# Patient Record
Sex: FEMALE | Race: WHITE | Employment: OTHER | URBAN - METROPOLITAN AREA
[De-identification: names, ages, dates, MRNs, and addresses within clinical notes are randomized per-mention and may not be internally consistent; named-entity substitution may affect disease eponyms.]

---

## 2017-01-01 ENCOUNTER — ONCOLOGY VISIT (OUTPATIENT)
Dept: ONCOLOGY | Facility: CLINIC | Age: 77
End: 2017-01-01
Attending: INTERNAL MEDICINE
Payer: COMMERCIAL

## 2017-01-01 ENCOUNTER — TELEPHONE (OUTPATIENT)
Dept: FAMILY MEDICINE | Facility: CLINIC | Age: 77
End: 2017-01-01

## 2017-01-01 ENCOUNTER — OFFICE VISIT (OUTPATIENT)
Dept: RADIATION THERAPY | Facility: OUTPATIENT CENTER | Age: 77
End: 2017-01-01

## 2017-01-01 ENCOUNTER — APPOINTMENT (OUTPATIENT)
Dept: CT IMAGING | Facility: CLINIC | Age: 77
End: 2017-01-01
Attending: EMERGENCY MEDICINE
Payer: MEDICARE

## 2017-01-01 ENCOUNTER — OFFICE VISIT (OUTPATIENT)
Dept: FAMILY MEDICINE | Facility: CLINIC | Age: 77
End: 2017-01-01
Payer: COMMERCIAL

## 2017-01-01 ENCOUNTER — HOSPITAL ENCOUNTER (OUTPATIENT)
Dept: CT IMAGING | Facility: CLINIC | Age: 77
End: 2017-01-23
Attending: INTERNAL MEDICINE
Payer: MEDICARE

## 2017-01-01 ENCOUNTER — HOSPITAL ENCOUNTER (OUTPATIENT)
Facility: CLINIC | Age: 77
Setting detail: OBSERVATION
Discharge: HOME | End: 2017-06-28
Attending: EMERGENCY MEDICINE | Admitting: FAMILY MEDICINE
Payer: MEDICARE

## 2017-01-01 ENCOUNTER — TELEPHONE (OUTPATIENT)
Dept: RADIATION THERAPY | Facility: OUTPATIENT CENTER | Age: 77
End: 2017-01-01

## 2017-01-01 ENCOUNTER — APPOINTMENT (OUTPATIENT)
Dept: GENERAL RADIOLOGY | Facility: CLINIC | Age: 77
End: 2017-01-01
Attending: FAMILY MEDICINE
Payer: MEDICARE

## 2017-01-01 ENCOUNTER — MYC MEDICAL ADVICE (OUTPATIENT)
Dept: FAMILY MEDICINE | Facility: CLINIC | Age: 77
End: 2017-01-01

## 2017-01-01 ENCOUNTER — TELEPHONE (OUTPATIENT)
Dept: ONCOLOGY | Facility: CLINIC | Age: 77
End: 2017-01-01

## 2017-01-01 ENCOUNTER — CARE COORDINATION (OUTPATIENT)
Dept: CARE COORDINATION | Facility: CLINIC | Age: 77
End: 2017-01-01

## 2017-01-01 ENCOUNTER — HOSPITAL ENCOUNTER (OUTPATIENT)
Dept: LAB | Facility: CLINIC | Age: 77
Discharge: HOME | End: 2017-01-23
Attending: INTERNAL MEDICINE | Admitting: INTERNAL MEDICINE
Payer: MEDICARE

## 2017-01-01 ENCOUNTER — HOSPITAL ENCOUNTER (EMERGENCY)
Facility: CLINIC | Age: 77
Discharge: HOME | End: 2017-05-28
Attending: FAMILY MEDICINE | Admitting: FAMILY MEDICINE
Payer: MEDICARE

## 2017-01-01 VITALS
DIASTOLIC BLOOD PRESSURE: 77 MMHG | HEART RATE: 90 BPM | RESPIRATION RATE: 20 BRPM | OXYGEN SATURATION: 94 % | SYSTOLIC BLOOD PRESSURE: 149 MMHG

## 2017-01-01 VITALS
SYSTOLIC BLOOD PRESSURE: 180 MMHG | OXYGEN SATURATION: 98 % | HEART RATE: 96 BPM | WEIGHT: 120.4 LBS | TEMPERATURE: 97 F | DIASTOLIC BLOOD PRESSURE: 68 MMHG | BODY MASS INDEX: 21.33 KG/M2

## 2017-01-01 VITALS
SYSTOLIC BLOOD PRESSURE: 137 MMHG | HEART RATE: 100 BPM | TEMPERATURE: 97.7 F | BODY MASS INDEX: 20.91 KG/M2 | WEIGHT: 118 LBS | OXYGEN SATURATION: 98 % | HEIGHT: 63 IN | DIASTOLIC BLOOD PRESSURE: 74 MMHG | RESPIRATION RATE: 16 BRPM

## 2017-01-01 VITALS
RESPIRATION RATE: 22 BRPM | TEMPERATURE: 98 F | DIASTOLIC BLOOD PRESSURE: 81 MMHG | OXYGEN SATURATION: 95 % | SYSTOLIC BLOOD PRESSURE: 152 MMHG | HEART RATE: 88 BPM

## 2017-01-01 VITALS — SYSTOLIC BLOOD PRESSURE: 155 MMHG | HEART RATE: 78 BPM | DIASTOLIC BLOOD PRESSURE: 75 MMHG

## 2017-01-01 VITALS
TEMPERATURE: 98.1 F | WEIGHT: 120 LBS | BODY MASS INDEX: 21.26 KG/M2 | HEIGHT: 63 IN | DIASTOLIC BLOOD PRESSURE: 81 MMHG | RESPIRATION RATE: 16 BRPM | HEART RATE: 82 BPM | SYSTOLIC BLOOD PRESSURE: 130 MMHG | OXYGEN SATURATION: 97 %

## 2017-01-01 VITALS
DIASTOLIC BLOOD PRESSURE: 81 MMHG | HEART RATE: 81 BPM | WEIGHT: 118.17 LBS | BODY MASS INDEX: 20.94 KG/M2 | OXYGEN SATURATION: 98 % | HEIGHT: 63 IN | SYSTOLIC BLOOD PRESSURE: 153 MMHG | RESPIRATION RATE: 18 BRPM | TEMPERATURE: 98.7 F

## 2017-01-01 VITALS
HEART RATE: 96 BPM | OXYGEN SATURATION: 98 % | DIASTOLIC BLOOD PRESSURE: 77 MMHG | RESPIRATION RATE: 18 BRPM | SYSTOLIC BLOOD PRESSURE: 156 MMHG

## 2017-01-01 DIAGNOSIS — B02.9 HERPES ZOSTER WITHOUT COMPLICATION: Primary | ICD-10-CM

## 2017-01-01 DIAGNOSIS — J44.9 CHRONIC OBSTRUCTIVE PULMONARY DISEASE, UNSPECIFIED COPD TYPE (H): Chronic | ICD-10-CM

## 2017-01-01 DIAGNOSIS — C34.90 MALIGNANT NEOPLASM OF LUNG, UNSPECIFIED LATERALITY, UNSPECIFIED PART OF LUNG (H): Chronic | ICD-10-CM

## 2017-01-01 DIAGNOSIS — C79.51 BONE METASTASIS: Primary | ICD-10-CM

## 2017-01-01 DIAGNOSIS — K92.2 GASTROINTESTINAL HEMORRHAGE, UNSPECIFIED GASTROINTESTINAL HEMORRHAGE TYPE: ICD-10-CM

## 2017-01-01 DIAGNOSIS — M25.552 PAIN IN JOINT INVOLVING LEFT PELVIC REGION AND THIGH: ICD-10-CM

## 2017-01-01 DIAGNOSIS — C34.12 MALIGNANT NEOPLASM OF UPPER LOBE OF LEFT LUNG (H): ICD-10-CM

## 2017-01-01 DIAGNOSIS — J44.9 CHRONIC OBSTRUCTIVE PULMONARY DISEASE, UNSPECIFIED COPD TYPE (H): ICD-10-CM

## 2017-01-01 DIAGNOSIS — K52.9 COLITIS: Primary | ICD-10-CM

## 2017-01-01 DIAGNOSIS — K52.9 COLITIS: ICD-10-CM

## 2017-01-01 DIAGNOSIS — J44.9 CHRONIC OBSTRUCTIVE PULMONARY DISEASE, UNSPECIFIED COPD TYPE (H): Primary | Chronic | ICD-10-CM

## 2017-01-01 DIAGNOSIS — C34.90 MALIGNANT NEOPLASM OF LUNG, UNSPECIFIED LATERALITY, UNSPECIFIED PART OF LUNG (H): Primary | ICD-10-CM

## 2017-01-01 DIAGNOSIS — M25.552 PAIN IN JOINT INVOLVING LEFT PELVIC REGION AND THIGH: Primary | ICD-10-CM

## 2017-01-01 DIAGNOSIS — R23.3 SPONTANEOUS ECCHYMOSIS: ICD-10-CM

## 2017-01-01 DIAGNOSIS — C79.51 BONE METASTASIS: Chronic | ICD-10-CM

## 2017-01-01 DIAGNOSIS — Z79.899 MEDICAL MARIJUANA USE: ICD-10-CM

## 2017-01-01 DIAGNOSIS — R93.89 ABNORMAL MRI: ICD-10-CM

## 2017-01-01 DIAGNOSIS — C34.12 MALIGNANT NEOPLASM OF UPPER LOBE OF LEFT LUNG (H): Primary | ICD-10-CM

## 2017-01-01 DIAGNOSIS — C79.51 METASTASIS TO BONE (H): Primary | ICD-10-CM

## 2017-01-01 LAB
ABO + RH BLD: NORMAL
ABO + RH BLD: NORMAL
ALBUMIN SERPL-MCNC: 3.5 G/DL (ref 3.4–5)
ALP SERPL-CCNC: 97 U/L (ref 40–150)
ALT SERPL W P-5'-P-CCNC: 17 U/L (ref 0–50)
ANION GAP SERPL CALCULATED.3IONS-SCNC: 4 MMOL/L (ref 3–14)
ANION GAP SERPL CALCULATED.3IONS-SCNC: 7 MMOL/L (ref 3–14)
ANION GAP SERPL CALCULATED.3IONS-SCNC: 9 MMOL/L (ref 3–14)
AST SERPL W P-5'-P-CCNC: 14 U/L (ref 0–45)
BASOPHILS # BLD AUTO: 0 10E9/L (ref 0–0.2)
BASOPHILS # BLD AUTO: 0.1 10E9/L (ref 0–0.2)
BASOPHILS NFR BLD AUTO: 0.4 %
BASOPHILS NFR BLD AUTO: 0.5 %
BILIRUB SERPL-MCNC: 0.6 MG/DL (ref 0.2–1.3)
BLD GP AB SCN SERPL QL: NORMAL
BLOOD BANK CMNT PATIENT-IMP: NORMAL
BUN SERPL-MCNC: 14 MG/DL (ref 7–30)
BUN SERPL-MCNC: 14 MG/DL (ref 7–30)
BUN SERPL-MCNC: 8 MG/DL (ref 7–30)
CALCIUM SERPL-MCNC: 8.4 MG/DL (ref 8.5–10.1)
CALCIUM SERPL-MCNC: 8.7 MG/DL (ref 8.5–10.1)
CALCIUM SERPL-MCNC: 8.7 MG/DL (ref 8.5–10.1)
CAMPYLOBACTER GROUP BY NAT: NOT DETECTED
CHLORIDE SERPL-SCNC: 103 MMOL/L (ref 94–109)
CHLORIDE SERPL-SCNC: 107 MMOL/L (ref 94–109)
CHLORIDE SERPL-SCNC: 110 MMOL/L (ref 94–109)
CO2 SERPL-SCNC: 25 MMOL/L (ref 20–32)
CO2 SERPL-SCNC: 25 MMOL/L (ref 20–32)
CO2 SERPL-SCNC: 28 MMOL/L (ref 20–32)
CREAT SERPL-MCNC: 0.63 MG/DL (ref 0.52–1.04)
CREAT SERPL-MCNC: 0.64 MG/DL (ref 0.52–1.04)
CREAT SERPL-MCNC: 0.77 MG/DL (ref 0.52–1.04)
DIFFERENTIAL METHOD BLD: ABNORMAL
DIFFERENTIAL METHOD BLD: ABNORMAL
ENTERIC PATHOGEN COMMENT: NORMAL
EOSINOPHIL # BLD AUTO: 0.1 10E9/L (ref 0–0.7)
EOSINOPHIL # BLD AUTO: 0.1 10E9/L (ref 0–0.7)
EOSINOPHIL NFR BLD AUTO: 0.4 %
EOSINOPHIL NFR BLD AUTO: 1 %
ERYTHROCYTE [DISTWIDTH] IN BLOOD BY AUTOMATED COUNT: 11.6 % (ref 10–15)
ERYTHROCYTE [DISTWIDTH] IN BLOOD BY AUTOMATED COUNT: 12.2 % (ref 10–15)
GFR SERPL CREATININE-BSD FRML MDRD: 73 ML/MIN/1.7M2
GFR SERPL CREATININE-BSD FRML MDRD: 90 ML/MIN/1.7M2
GFR SERPL CREATININE-BSD FRML MDRD: ABNORMAL ML/MIN/1.7M2
GLUCOSE SERPL-MCNC: 101 MG/DL (ref 70–99)
GLUCOSE SERPL-MCNC: 133 MG/DL (ref 70–99)
GLUCOSE SERPL-MCNC: 91 MG/DL (ref 70–99)
HBA1C MFR BLD: 5.1 % (ref 4.3–6)
HCT VFR BLD AUTO: 33.6 % (ref 35–47)
HCT VFR BLD AUTO: 37.4 % (ref 35–47)
HGB BLD-MCNC: 10.6 G/DL (ref 11.7–15.7)
HGB BLD-MCNC: 10.6 G/DL (ref 11.7–15.7)
HGB BLD-MCNC: 11.3 G/DL (ref 11.7–15.7)
HGB BLD-MCNC: 11.6 G/DL (ref 11.7–15.7)
HGB BLD-MCNC: 11.6 G/DL (ref 11.7–15.7)
HGB BLD-MCNC: 12.2 G/DL (ref 11.7–15.7)
HGB BLD-MCNC: 12.4 G/DL (ref 11.7–15.7)
IMM GRANULOCYTES # BLD: 0 10E9/L (ref 0–0.4)
IMM GRANULOCYTES # BLD: 0 10E9/L (ref 0–0.4)
IMM GRANULOCYTES NFR BLD: 0.1 %
IMM GRANULOCYTES NFR BLD: 0.2 %
LACTATE BLD-SCNC: 0.9 MMOL/L (ref 0.7–2.1)
LYMPHOCYTES # BLD AUTO: 0.6 10E9/L (ref 0.8–5.3)
LYMPHOCYTES # BLD AUTO: 0.8 10E9/L (ref 0.8–5.3)
LYMPHOCYTES NFR BLD AUTO: 4.6 %
LYMPHOCYTES NFR BLD AUTO: 8.7 %
MCH RBC QN AUTO: 31.3 PG (ref 26.5–33)
MCH RBC QN AUTO: 32.1 PG (ref 26.5–33)
MCHC RBC AUTO-ENTMCNC: 31.5 G/DL (ref 31.5–36.5)
MCHC RBC AUTO-ENTMCNC: 33.2 G/DL (ref 31.5–36.5)
MCV RBC AUTO: 97 FL (ref 78–100)
MCV RBC AUTO: 99 FL (ref 78–100)
MONOCYTES # BLD AUTO: 0.8 10E9/L (ref 0–1.3)
MONOCYTES # BLD AUTO: 1 10E9/L (ref 0–1.3)
MONOCYTES NFR BLD AUTO: 10.7 %
MONOCYTES NFR BLD AUTO: 6.6 %
NEUTROPHILS # BLD AUTO: 11.1 10E9/L (ref 1.6–8.3)
NEUTROPHILS # BLD AUTO: 7.6 10E9/L (ref 1.6–8.3)
NEUTROPHILS NFR BLD AUTO: 79.1 %
NEUTROPHILS NFR BLD AUTO: 87.7 %
NOROVIRUS I AND II BY NAT: NOT DETECTED
PLATELET # BLD AUTO: 264 10E9/L (ref 150–450)
PLATELET # BLD AUTO: 335 10E9/L (ref 150–450)
POTASSIUM SERPL-SCNC: 3.7 MMOL/L (ref 3.4–5.3)
POTASSIUM SERPL-SCNC: 4.5 MMOL/L (ref 3.4–5.3)
POTASSIUM SERPL-SCNC: 4.5 MMOL/L (ref 3.4–5.3)
PROT SERPL-MCNC: 7.1 G/DL (ref 6.8–8.8)
RBC # BLD AUTO: 3.39 10E12/L (ref 3.8–5.2)
RBC # BLD AUTO: 3.86 10E12/L (ref 3.8–5.2)
ROTAVIRUS A BY NAT: NOT DETECTED
SALMONELLA SPECIES BY NAT: NOT DETECTED
SHIGA TOXIN 1 GENE BY NAT: NOT DETECTED
SHIGA TOXIN 2 GENE BY NAT: NOT DETECTED
SHIGELLA SP+EIEC IPAH STL QL NAA+PROBE: NOT DETECTED
SODIUM SERPL-SCNC: 135 MMOL/L (ref 133–144)
SODIUM SERPL-SCNC: 141 MMOL/L (ref 133–144)
SODIUM SERPL-SCNC: 142 MMOL/L (ref 133–144)
SPECIMEN EXP DATE BLD: NORMAL
VIBRIO GROUP BY NAT: NOT DETECTED
WBC # BLD AUTO: 12.7 10E9/L (ref 4–11)
WBC # BLD AUTO: 9.6 10E9/L (ref 4–11)
YERSINIA ENTEROCOLITICA BY NAT: NOT DETECTED

## 2017-01-01 PROCEDURE — 99283 EMERGENCY DEPT VISIT LOW MDM: CPT

## 2017-01-01 PROCEDURE — 94640 AIRWAY INHALATION TREATMENT: CPT

## 2017-01-01 PROCEDURE — 80053 COMPREHEN METABOLIC PANEL: CPT | Performed by: EMERGENCY MEDICINE

## 2017-01-01 PROCEDURE — 86901 BLOOD TYPING SEROLOGIC RH(D): CPT | Performed by: EMERGENCY MEDICINE

## 2017-01-01 PROCEDURE — 96376 TX/PRO/DX INJ SAME DRUG ADON: CPT

## 2017-01-01 PROCEDURE — 72192 CT PELVIS W/O DYE: CPT

## 2017-01-01 PROCEDURE — 99207 ZZC CDG-CHARGE REQUIRED MANUAL ENTRY: CPT | Performed by: FAMILY MEDICINE

## 2017-01-01 PROCEDURE — 99217 ZZC OBSERVATION CARE DISCHARGE: CPT | Performed by: FAMILY MEDICINE

## 2017-01-01 PROCEDURE — 85018 HEMOGLOBIN: CPT | Mod: 91 | Performed by: PHYSICIAN ASSISTANT

## 2017-01-01 PROCEDURE — A9270 NON-COVERED ITEM OR SERVICE: HCPCS | Mod: GY | Performed by: FAMILY MEDICINE

## 2017-01-01 PROCEDURE — 99285 EMERGENCY DEPT VISIT HI MDM: CPT | Mod: 25

## 2017-01-01 PROCEDURE — 83605 ASSAY OF LACTIC ACID: CPT | Performed by: FAMILY MEDICINE

## 2017-01-01 PROCEDURE — 25000132 ZZH RX MED GY IP 250 OP 250 PS 637: Mod: GY | Performed by: EMERGENCY MEDICINE

## 2017-01-01 PROCEDURE — 99204 OFFICE O/P NEW MOD 45 MIN: CPT | Performed by: INTERNAL MEDICINE

## 2017-01-01 PROCEDURE — 74177 CT ABD & PELVIS W/CONTRAST: CPT

## 2017-01-01 PROCEDURE — 99220 ZZC INITIAL OBSERVATION CARE,LEVL III: CPT | Performed by: FAMILY MEDICINE

## 2017-01-01 PROCEDURE — 25000132 ZZH RX MED GY IP 250 OP 250 PS 637: Mod: GY | Performed by: PHYSICIAN ASSISTANT

## 2017-01-01 PROCEDURE — 36415 COLL VENOUS BLD VENIPUNCTURE: CPT | Performed by: FAMILY MEDICINE

## 2017-01-01 PROCEDURE — 96374 THER/PROPH/DIAG INJ IV PUSH: CPT

## 2017-01-01 PROCEDURE — 96361 HYDRATE IV INFUSION ADD-ON: CPT

## 2017-01-01 PROCEDURE — 94640 AIRWAY INHALATION TREATMENT: CPT | Mod: 76

## 2017-01-01 PROCEDURE — 85025 COMPLETE CBC W/AUTO DIFF WBC: CPT | Performed by: EMERGENCY MEDICINE

## 2017-01-01 PROCEDURE — 83036 HEMOGLOBIN GLYCOSYLATED A1C: CPT | Performed by: PHYSICIAN ASSISTANT

## 2017-01-01 PROCEDURE — 36415 COLL VENOUS BLD VENIPUNCTURE: CPT | Performed by: PHYSICIAN ASSISTANT

## 2017-01-01 PROCEDURE — A9270 NON-COVERED ITEM OR SERVICE: HCPCS | Mod: GY | Performed by: PHYSICIAN ASSISTANT

## 2017-01-01 PROCEDURE — G0378 HOSPITAL OBSERVATION PER HR: HCPCS

## 2017-01-01 PROCEDURE — 99283 EMERGENCY DEPT VISIT LOW MDM: CPT | Performed by: FAMILY MEDICINE

## 2017-01-01 PROCEDURE — 25000128 H RX IP 250 OP 636: Performed by: EMERGENCY MEDICINE

## 2017-01-01 PROCEDURE — 40000275 ZZH STATISTIC RCP TIME EA 10 MIN

## 2017-01-01 PROCEDURE — 25000125 ZZHC RX 250: Performed by: PHYSICIAN ASSISTANT

## 2017-01-01 PROCEDURE — 87506 IADNA-DNA/RNA PROBE TQ 6-11: CPT | Performed by: PHYSICIAN ASSISTANT

## 2017-01-01 PROCEDURE — A9270 NON-COVERED ITEM OR SERVICE: HCPCS | Mod: GY | Performed by: EMERGENCY MEDICINE

## 2017-01-01 PROCEDURE — 85025 COMPLETE CBC W/AUTO DIFF WBC: CPT | Performed by: PHYSICIAN ASSISTANT

## 2017-01-01 PROCEDURE — 85018 HEMOGLOBIN: CPT | Performed by: PHYSICIAN ASSISTANT

## 2017-01-01 PROCEDURE — 86850 RBC ANTIBODY SCREEN: CPT | Performed by: EMERGENCY MEDICINE

## 2017-01-01 PROCEDURE — 99495 TRANSJ CARE MGMT MOD F2F 14D: CPT | Performed by: PHYSICIAN ASSISTANT

## 2017-01-01 PROCEDURE — S0164 INJECTION PANTROPRAZOLE: HCPCS | Performed by: PHYSICIAN ASSISTANT

## 2017-01-01 PROCEDURE — 80048 BASIC METABOLIC PNL TOTAL CA: CPT | Performed by: INTERNAL MEDICINE

## 2017-01-01 PROCEDURE — 25000132 ZZH RX MED GY IP 250 OP 250 PS 637: Mod: GY | Performed by: FAMILY MEDICINE

## 2017-01-01 PROCEDURE — 86900 BLOOD TYPING SEROLOGIC ABO: CPT | Performed by: EMERGENCY MEDICINE

## 2017-01-01 PROCEDURE — 96360 HYDRATION IV INFUSION INIT: CPT

## 2017-01-01 PROCEDURE — 73590 X-RAY EXAM OF LOWER LEG: CPT | Mod: RT

## 2017-01-01 PROCEDURE — 40000270 ZZH STATISTIC OXYGEN  O2DAILY TECH TIME

## 2017-01-01 PROCEDURE — 96375 TX/PRO/DX INJ NEW DRUG ADDON: CPT

## 2017-01-01 PROCEDURE — 36415 COLL VENOUS BLD VENIPUNCTURE: CPT | Performed by: INTERNAL MEDICINE

## 2017-01-01 PROCEDURE — 80048 BASIC METABOLIC PNL TOTAL CA: CPT | Performed by: PHYSICIAN ASSISTANT

## 2017-01-01 PROCEDURE — 25000128 H RX IP 250 OP 636: Performed by: PHYSICIAN ASSISTANT

## 2017-01-01 PROCEDURE — 25000125 ZZHC RX 250: Performed by: EMERGENCY MEDICINE

## 2017-01-01 PROCEDURE — 99213 OFFICE O/P EST LOW 20 MIN: CPT | Performed by: NURSE PRACTITIONER

## 2017-01-01 PROCEDURE — 99211 OFF/OP EST MAY X REQ PHY/QHP: CPT

## 2017-01-01 PROCEDURE — 99285 EMERGENCY DEPT VISIT HI MDM: CPT | Performed by: EMERGENCY MEDICINE

## 2017-01-01 RX ORDER — ACETAMINOPHEN 325 MG/1
650 TABLET ORAL ONCE
Status: COMPLETED | OUTPATIENT
Start: 2017-01-01 | End: 2017-01-01

## 2017-01-01 RX ORDER — SODIUM CHLORIDE, SODIUM LACTATE, POTASSIUM CHLORIDE, CALCIUM CHLORIDE 600; 310; 30; 20 MG/100ML; MG/100ML; MG/100ML; MG/100ML
INJECTION, SOLUTION INTRAVENOUS CONTINUOUS
Status: DISCONTINUED | OUTPATIENT
Start: 2017-01-01 | End: 2017-01-01

## 2017-01-01 RX ORDER — ACETAMINOPHEN 325 MG/1
650 TABLET ORAL EVERY 4 HOURS PRN
Status: CANCELLED | OUTPATIENT
Start: 2017-01-01

## 2017-01-01 RX ORDER — OXYCODONE HYDROCHLORIDE 5 MG/1
5 TABLET ORAL EVERY 6 HOURS PRN
Qty: 30 TABLET | Refills: 0 | Status: SHIPPED | OUTPATIENT
Start: 2017-01-01 | End: 2017-01-01

## 2017-01-01 RX ORDER — BUDESONIDE 0.25 MG/2ML
0.25 INHALANT ORAL 2 TIMES DAILY
Status: DISCONTINUED | OUTPATIENT
Start: 2017-01-01 | End: 2017-01-01 | Stop reason: HOSPADM

## 2017-01-01 RX ORDER — POLYETHYLENE GLYCOL 3350 17 G/17G
1 POWDER, FOR SOLUTION ORAL DAILY PRN
COMMUNITY

## 2017-01-01 RX ORDER — ONDANSETRON 4 MG/1
4 TABLET, ORALLY DISINTEGRATING ORAL EVERY 6 HOURS PRN
Status: DISCONTINUED | OUTPATIENT
Start: 2017-01-01 | End: 2017-01-01 | Stop reason: HOSPADM

## 2017-01-01 RX ORDER — BUDESONIDE 0.5 MG/2ML
0.5 INHALANT ORAL DAILY
Qty: 60 ML | Refills: 11 | Status: SHIPPED | OUTPATIENT
Start: 2017-01-01

## 2017-01-01 RX ORDER — IBUPROFEN 400 MG/1
400 TABLET, FILM COATED ORAL ONCE
Status: DISCONTINUED | OUTPATIENT
Start: 2017-01-01 | End: 2017-01-01

## 2017-01-01 RX ORDER — NALOXONE HYDROCHLORIDE 0.4 MG/ML
.1-.4 INJECTION, SOLUTION INTRAMUSCULAR; INTRAVENOUS; SUBCUTANEOUS
Status: DISCONTINUED | OUTPATIENT
Start: 2017-01-01 | End: 2017-01-01 | Stop reason: HOSPADM

## 2017-01-01 RX ORDER — PROCHLORPERAZINE MALEATE 5 MG
5 TABLET ORAL EVERY 6 HOURS PRN
Status: DISCONTINUED | OUTPATIENT
Start: 2017-01-01 | End: 2017-01-01 | Stop reason: HOSPADM

## 2017-01-01 RX ORDER — IPRATROPIUM BROMIDE AND ALBUTEROL 20; 100 UG/1; UG/1
SPRAY, METERED RESPIRATORY (INHALATION)
Qty: 4 G | Refills: 11 | Status: SHIPPED | OUTPATIENT
Start: 2017-01-01

## 2017-01-01 RX ORDER — ACETAMINOPHEN 500 MG
1000 TABLET ORAL EVERY 6 HOURS PRN
Status: DISCONTINUED | OUTPATIENT
Start: 2017-01-01 | End: 2017-01-01 | Stop reason: HOSPADM

## 2017-01-01 RX ORDER — IOPAMIDOL 755 MG/ML
53 INJECTION, SOLUTION INTRAVASCULAR ONCE
Status: COMPLETED | OUTPATIENT
Start: 2017-01-01 | End: 2017-01-01

## 2017-01-01 RX ORDER — MULTIVITAMIN,THERAPEUTIC
1 TABLET ORAL DAILY
Status: DISCONTINUED | OUTPATIENT
Start: 2017-01-01 | End: 2017-01-01 | Stop reason: HOSPADM

## 2017-01-01 RX ORDER — PROCHLORPERAZINE 25 MG
12.5 SUPPOSITORY, RECTAL RECTAL EVERY 12 HOURS PRN
Status: DISCONTINUED | OUTPATIENT
Start: 2017-01-01 | End: 2017-01-01 | Stop reason: HOSPADM

## 2017-01-01 RX ORDER — GABAPENTIN 100 MG/1
100-300 CAPSULE ORAL 3 TIMES DAILY
Qty: 180 CAPSULE | Refills: 0 | Status: SHIPPED | OUTPATIENT
Start: 2017-01-01

## 2017-01-01 RX ORDER — HYDROMORPHONE HYDROCHLORIDE 2 MG/1
2 TABLET ORAL EVERY 4 HOURS
Qty: 18 TABLET | Refills: 0 | Status: SHIPPED | OUTPATIENT
Start: 2017-01-01 | End: 2017-01-01

## 2017-01-01 RX ORDER — IOPAMIDOL 755 MG/ML
53 INJECTION, SOLUTION INTRAVASCULAR ONCE
Status: DISCONTINUED | OUTPATIENT
Start: 2017-01-01 | End: 2017-01-01 | Stop reason: CLARIF

## 2017-01-01 RX ORDER — VALACYCLOVIR HYDROCHLORIDE 1 G/1
1000 TABLET, FILM COATED ORAL 3 TIMES DAILY
Qty: 21 TABLET | Refills: 0 | Status: SHIPPED | OUTPATIENT
Start: 2017-01-01 | End: 2017-01-01

## 2017-01-01 RX ORDER — ACETAMINOPHEN 325 MG/1
650 TABLET ORAL EVERY 4 HOURS PRN
Status: DISCONTINUED | OUTPATIENT
Start: 2017-01-01 | End: 2017-01-01

## 2017-01-01 RX ORDER — HYDROMORPHONE HYDROCHLORIDE 2 MG/1
1-2 TABLET ORAL EVERY 4 HOURS
Qty: 18 TABLET | Refills: 0 | Status: SHIPPED | OUTPATIENT
Start: 2017-01-01

## 2017-01-01 RX ORDER — IPRATROPIUM BROMIDE AND ALBUTEROL SULFATE 2.5; .5 MG/3ML; MG/3ML
3 SOLUTION RESPIRATORY (INHALATION) 4 TIMES DAILY
Status: DISCONTINUED | OUTPATIENT
Start: 2017-01-01 | End: 2017-01-01

## 2017-01-01 RX ORDER — LANOLIN ALCOHOL/MO/W.PET/CERES
100 CREAM (GRAM) TOPICAL DAILY
Status: DISCONTINUED | OUTPATIENT
Start: 2017-01-01 | End: 2017-01-01 | Stop reason: HOSPADM

## 2017-01-01 RX ORDER — DEXTROSE, SODIUM CHLORIDE, SODIUM LACTATE, POTASSIUM CHLORIDE, AND CALCIUM CHLORIDE 5; .6; .31; .03; .02 G/100ML; G/100ML; G/100ML; G/100ML; G/100ML
INJECTION, SOLUTION INTRAVENOUS CONTINUOUS
Status: DISCONTINUED | OUTPATIENT
Start: 2017-01-01 | End: 2017-01-01

## 2017-01-01 RX ORDER — LORAZEPAM 2 MG/ML
1-2 INJECTION INTRAMUSCULAR EVERY 30 MIN PRN
Status: DISCONTINUED | OUTPATIENT
Start: 2017-01-01 | End: 2017-01-01 | Stop reason: HOSPADM

## 2017-01-01 RX ORDER — LORAZEPAM 1 MG/1
1-2 TABLET ORAL EVERY 30 MIN PRN
Status: DISCONTINUED | OUTPATIENT
Start: 2017-01-01 | End: 2017-01-01 | Stop reason: HOSPADM

## 2017-01-01 RX ORDER — ACETAMINOPHEN 500 MG
1000 TABLET ORAL EVERY 8 HOURS PRN
COMMUNITY

## 2017-01-01 RX ORDER — IPRATROPIUM BROMIDE AND ALBUTEROL SULFATE 2.5; .5 MG/3ML; MG/3ML
3 SOLUTION RESPIRATORY (INHALATION)
Status: DISCONTINUED | OUTPATIENT
Start: 2017-01-01 | End: 2017-01-01 | Stop reason: HOSPADM

## 2017-01-01 RX ORDER — FOLIC ACID 1 MG/1
1 TABLET ORAL DAILY
Status: DISCONTINUED | OUTPATIENT
Start: 2017-01-01 | End: 2017-01-01 | Stop reason: HOSPADM

## 2017-01-01 RX ORDER — ONDANSETRON 2 MG/ML
4 INJECTION INTRAMUSCULAR; INTRAVENOUS EVERY 6 HOURS PRN
Status: DISCONTINUED | OUTPATIENT
Start: 2017-01-01 | End: 2017-01-01 | Stop reason: HOSPADM

## 2017-01-01 RX ADMIN — FOLIC ACID: 5 INJECTION, SOLUTION INTRAMUSCULAR; INTRAVENOUS; SUBCUTANEOUS at 11:48

## 2017-01-01 RX ADMIN — IPRATROPIUM BROMIDE AND ALBUTEROL SULFATE 3 ML: .5; 3 SOLUTION RESPIRATORY (INHALATION) at 12:08

## 2017-01-01 RX ADMIN — MULTIVITAMIN TABLET 1 TABLET: TABLET at 11:14

## 2017-01-01 RX ADMIN — FOLIC ACID 1 MG: 1 TABLET ORAL at 11:14

## 2017-01-01 RX ADMIN — IPRATROPIUM BROMIDE AND ALBUTEROL SULFATE 3 ML: .5; 3 SOLUTION RESPIRATORY (INHALATION) at 14:18

## 2017-01-01 RX ADMIN — ACETAMINOPHEN 650 MG: 325 TABLET, FILM COATED ORAL at 07:01

## 2017-01-01 RX ADMIN — SODIUM CHLORIDE, SODIUM LACTATE, POTASSIUM CHLORIDE, CALCIUM CHLORIDE AND DEXTROSE MONOHYDRATE: 5; 600; 310; 30; 20 INJECTION, SOLUTION INTRAVENOUS at 09:34

## 2017-01-01 RX ADMIN — PANTOPRAZOLE SODIUM 40 MG: 40 INJECTION, POWDER, FOR SOLUTION INTRAVENOUS at 09:32

## 2017-01-01 RX ADMIN — ACETAMINOPHEN 1000 MG: 500 TABLET ORAL at 14:15

## 2017-01-01 RX ADMIN — IPRATROPIUM BROMIDE AND ALBUTEROL SULFATE 3 ML: .5; 3 SOLUTION RESPIRATORY (INHALATION) at 15:54

## 2017-01-01 RX ADMIN — ACETAMINOPHEN 1000 MG: 500 TABLET ORAL at 04:29

## 2017-01-01 RX ADMIN — IPRATROPIUM BROMIDE AND ALBUTEROL SULFATE 3 ML: .5; 3 SOLUTION RESPIRATORY (INHALATION) at 19:13

## 2017-01-01 RX ADMIN — IPRATROPIUM BROMIDE AND ALBUTEROL SULFATE 3 ML: .5; 3 SOLUTION RESPIRATORY (INHALATION) at 22:07

## 2017-01-01 RX ADMIN — ACETAMINOPHEN 1000 MG: 500 TABLET ORAL at 21:14

## 2017-01-01 RX ADMIN — SODIUM CHLORIDE 80 MG: 9 INJECTION, SOLUTION INTRAVENOUS at 08:08

## 2017-01-01 RX ADMIN — Medication 100 MG: at 11:14

## 2017-01-01 RX ADMIN — BUDESONIDE 0.25 MG: 0.25 INHALANT RESPIRATORY (INHALATION) at 12:59

## 2017-01-01 RX ADMIN — PANTOPRAZOLE SODIUM 40 MG: 40 INJECTION, POWDER, FOR SOLUTION INTRAVENOUS at 08:13

## 2017-01-01 RX ADMIN — IPRATROPIUM BROMIDE AND ALBUTEROL SULFATE 3 ML: .5; 3 SOLUTION RESPIRATORY (INHALATION) at 03:39

## 2017-01-01 RX ADMIN — SODIUM CHLORIDE, POTASSIUM CHLORIDE, SODIUM LACTATE AND CALCIUM CHLORIDE: 600; 310; 30; 20 INJECTION, SOLUTION INTRAVENOUS at 06:00

## 2017-01-01 RX ADMIN — BUDESONIDE 0.25 MG: 0.25 INHALANT RESPIRATORY (INHALATION) at 19:13

## 2017-01-01 RX ADMIN — BUDESONIDE 0.25 MG: 0.25 INHALANT RESPIRATORY (INHALATION) at 08:45

## 2017-01-01 RX ADMIN — IPRATROPIUM BROMIDE AND ALBUTEROL SULFATE 3 ML: .5; 3 SOLUTION RESPIRATORY (INHALATION) at 12:58

## 2017-01-01 RX ADMIN — ACETAMINOPHEN 1000 MG: 500 TABLET ORAL at 13:48

## 2017-01-01 RX ADMIN — IPRATROPIUM BROMIDE AND ALBUTEROL SULFATE 3 ML: .5; 3 SOLUTION RESPIRATORY (INHALATION) at 08:43

## 2017-01-01 RX ADMIN — PANTOPRAZOLE SODIUM 40 MG: 40 INJECTION, POWDER, FOR SOLUTION INTRAVENOUS at 21:21

## 2017-01-01 RX ADMIN — IPRATROPIUM BROMIDE AND ALBUTEROL SULFATE 3 ML: .5; 3 SOLUTION RESPIRATORY (INHALATION) at 00:39

## 2017-01-01 RX ADMIN — IOPAMIDOL 53 ML: 755 INJECTION, SOLUTION INTRAVENOUS at 05:44

## 2017-01-01 RX ADMIN — SODIUM CHLORIDE 53 ML: 9 INJECTION, SOLUTION INTRAVENOUS at 05:44

## 2017-01-01 RX ADMIN — IPRATROPIUM BROMIDE AND ALBUTEROL SULFATE 3 ML: .5; 3 SOLUTION RESPIRATORY (INHALATION) at 09:11

## 2017-01-01 ASSESSMENT — ENCOUNTER SYMPTOMS
FATIGUE: 1
BRUISES/BLEEDS EASILY: 0
CHEST TIGHTNESS: 0
FEVER: 0
NAUSEA: 0
BLOOD IN STOOL: 1
NEUROLOGICAL NEGATIVE: 1
FEVER: 0
BACK PAIN: 0
PALPITATIONS: 0
BACK PAIN: 0
APPETITE CHANGE: 0
DYSURIA: 0
WEAKNESS: 1
LIGHT-HEADEDNESS: 0
DIARRHEA: 1
HEADACHES: 0
SHORTNESS OF BREATH: 1
ABDOMINAL PAIN: 0
PSYCHIATRIC NEGATIVE: 1
ABDOMINAL PAIN: 0
CHILLS: 0
VOMITING: 0

## 2017-01-01 ASSESSMENT — PAIN SCALES - GENERAL: PAINLEVEL: MODERATE PAIN (5)

## 2017-01-23 NOTE — PROGRESS NOTES
"NEW PATIENT CONSULTATION      PRIMARY CARE PHYSICIAN:  Amarilis Almaguer PA-C          CHIEF COMPLAINT AND REASON FOR VISIT:  History of lung cancer.  No pathology by PET scan in 2012, presented with left-sided pelvic pain since 10/2016.      HISTORY OF PRESENT ILLNESS:  Alethea Britt is a 76-year-old female, has left upper lobe primary lesion identified in 10/2012 by PET scan.  PET active 3.2 cm, SUV 16 lesion associated with other pulmonary nodules.  At that time, no biopsy was done.  She was followed by pulmonologist and her primary care.  Patient requests no biopsy, no further treatment.  She was enrolled in hospice and then a \"graduated\" from hospice.      She has chronic COPD on oxygenation since 1998 when she also quit smoking.      She presented with left-sided pelvic pain since 10/2016.  MRI of lumbar spine was done 12/2016, identified DJD and incompletely visualized lesion in the posterior left iliac bones suspicious for metastatic deposit.  She is taking Extra Strength Tylenol alternating with NSAID.  The pain varies between level 4 and level 8/10.  She is still ambulating.  She feels better when she stands. There are no bowel movements and urination disturbance at this point.  She denies no weight loss.  No night sweats.  She is on portable oxygen.  Other than the pain, she is in her usual state of health.      PAST MEDICAL HISTORY:  COPD, on portable oxygen;, former smoker, quit in 1998; chronic inflammatory demyelinating polyneuropathy and hyperlipidemia, reflux, osteoporosis, history of abnormal PET scan 2012, highly suggestive of lung cancer.  No biopsy was made based on patient's request and her risk features.      MEDICATIONS:  Reviewed in Epic system.      SOCIAL HISTORY:  She lives in [place] with her .  She quit smoking in 1998.  Denies alcohol abuse.  She is otherwise a functional 76 year old.      FAMILY HISTORY:  Possibly Dad had lung cancer based on autopsy results.    "   REVIEW OF SYSTEMS:   GENERAL:  Left pelvic pain posteriorly since 10/2016.  No weight loss, no night sweats.   NEURO:   No headache, double vision, or focal weakness.  No neuropathy.   SKIN:  No chronic skin rash or skin infection.   CARDIOVASCULAR:  Hyperlipidemia.   PULMONARY:  Former heavy smoker, quit in 1998.  COPD, has been on portable oxygen since 1998.  Likely lung cancer based on PET scan 2012.  Patient elected no biopsy, no further treatment.   GI:  Reflux.   :  No urgency, frequency.  No recurrent urinary tract infection.  No kidney problems.   RHEUMATOLOGY/MUSCULOSKELETAL SYSTEM:  Left posterior pelvic pain since 10/2016.   ENDOCRINE:  Osteoporosis.   HEMATOLOGY:  No history of bleeding or thrombosis episode.   IMMUNOLOGY:  No recurrent fever or chills episode.  No recurrent infectious episode.   BREASTS/GYN:  No history of vaginal bleeding/discharge/dryness.  No breast pain or nipple discharge.       PHYSICAL EXAMINATION:   VITAL SIGNS:  Stable.   GENERAL APPEARANCE:  Elderly lady looks like stated age, not in acute distress.   HEENT: The patient is normocephalic, atraumatic. Pupils are equally reactive to light.  Sclerae are anicteric.  Moist oral mucosa.  Negative pharynx.  No oral thrush.   NECK:  Supple.  No jugular venous distention.  Thyroid is not palpable.   LYMPH NODES:  Superficial lymphadenopathy is not appreciable in the bilateral cervical, supraclavicular, axillary or inguinal areas.   CARDIOVASCULAR:  S1, S2 regular with no murmurs or gallops.  No carotid or abdominal bruits.   PULMONARY:  Lungs are clear to auscultation and percussion bilaterally.  There is no wheezing or rhonchi.   GASTROINTESTINAL:  Abdomen is soft, nontender.  No hepatosplenomegaly.  No signs of ascites.  No mass appreciable.   MUSCULOSKELETAL/EXTREMITIES:  She has minimal pain on palpation left posterior pelvic area.  She has normal straight leg exam on both legs.     NEUROLOGIC:  Cranial nerves II-XII are grossly  intact.  Sensation intact.  Muscle strength and muscle tone symmetrical, 5/5 throughout.   BACK:  No spinal or paraspinal tenderness.  No CVA tenderness.   SKIN:  No petechiae.  No rash.  No signs of cellulitis.       CURRENT LABORATORY DATA:   11/2016, CBC without diff.  BMP were fine.      CURRENT IMAGING DATA:    12/2016, MRI of the lumbar spine - identified DJD and incompletely visualized lesion in the posterior left iliac bones suspicious for metastatic deposit.       OLD DATA REVIEW WITH SUMMARY:    PET scan from 2012 identified left upper lobe 3.2 cm lesion, SUV 16.6.  Several nodules left lung base measuring up to 1 cm, SUV up to 5.5.      ASSESSMENT AND PLAN:   1.  History of presumed lung cancer by PET scan 12/2012.  Elected no biopsy, no treatment.  She was enrolled in hospice then graduated from hospice.      She presented with left iliac bone lesion based on lumbar MRI.  This is incompletely visualized.      We talked about the scenario which is lung cancer, metastases to the bone, not proven yet.  Asked her what she would like to be done now or as she had decided in 2012 .  She is clear that she is not interested in lung nodule biopsy.  She is not interested in systemic treatment including possible targeted therapy, immunotherapy, definitely no chemotherapy.  Yet, she is interested to proceed with radiation pending it has limited side effect and if it would help her pain.      She valued her quality of life very much.      Based on her wishes, recommend to proceed with the CT pelvic to further clarify the left iliac bone lesion.     Code status.  Based on the above, recommend DNR and DNI.     3.  Abnormal lumbar spine MRI in the context of possible lung cancer.  Recommend further CT workup and  Radiation Oncology consultation to discuss the indication of radiation based on her PET 2012, MRI 12/2016 and the new CT which will be done.     4.  Left pelvic pain.  She is using Extra Strength Tylenol  alternating with NSAID.  She is not open for oxycodone at this point, yet open for Extra Strength Tylenol.  Prescription is sent.         CLEMENT HERNANDEZ MD             D: 2017 09:53   T: 2017 10:42   MT: EMILY      Name:     ARRON BRADY   MRN:      [id number]        Account:      [id number]   :      1940           Visit Date:   2017      Document: [id number]

## 2017-01-23 NOTE — PATIENT INSTRUCTIONS
We would like to see you back after further workup and Radiation Oncology Consult.  Labs Today.  You were given a hand carry prescription for Tylenol #3.  When you are in need of a refill, please call your pharmacy and they will send us a request.  Copy of appointments, and after visit summary (AVS) given to patient.    If you have any questions during business hours (M-F 8 AM- 4PM), please call Matthew Gregorio RN, BSN, OCN or Ofelia Mooney RN, BSN, OCN Oncology Hematology Care Coordinators at [facility] [phone].   For questions after business hours, or on holidays/weekends, please call our after hours Nurse Triage line [phone]. Thank you.

## 2017-01-23 NOTE — MR AVS SNAPSHOT
After Visit Summary   1/23/2017    Alethea Britt    MRN: [id number]           Patient Information     Date Of Birth          1940        Visit Information        Provider Department      1/23/2017 8:45 AM Zarina Duncan MD [facility]        Today's Diagnoses     Malignant neoplasm of upper lobe of left lung (H)    -  1     Pain in joint involving left pelvic region and thigh           Care Instructions    We would like to see you back after further workup and Radiation Oncology Consult.  Labs Today.  You were given a hand carry prescription for Tylenol #3.  When you are in need of a refill, please call your pharmacy and they will send us a request.  Copy of appointments, and after visit summary (AVS) given to patient.    If you have any questions during business hours (M-F 8 AM- 4PM), please call Matthew Gregorio RN, BSN, OCN or Ofelia Mooney RN, BSN, OCN Oncology Hematology Care Coordinators at [facility] [phone].   For questions after business hours, or on holidays/weekends, please call our after hours Nurse Triage line [phone]. Thank you.            Follow-ups after your visit        Your next 10 appointments already scheduled     Jan 23, 2017 10:30 AM   LAB [facility] ([facility])    [address] [facility] [address] [phone]           Patient must bring picture ID.  Patient should be prepared to give a urine specimen  Please do not eat 10-12 hours before your appointment if you are coming in fasting for labs on lipids, cholesterol, or glucose (sugar).  Pregnant women should follow their Care Team instructions. Water with medications is okay. Do not drink coffee or other fluids.   If you have concerns about taking  your medications, please ask at office or if scheduling via Slingr, send a message by clicking on Secure Messaging, Message Your Care Team.            Jan 23, 2017   3:15 PM   CT PELVIS W CONTRAST with W[facility]CT1   [facility] CT ([facility])    [address]  [facility] [address] [phone]           Please bring any scans or X-rays taken at other hospitals, if similar tests were done. Also bring a list of your medicines, including vitamins, minerals and over-the-counter drugs. It is safest to leave personal items at home.  Be sure to tell your doctor:   If you have any allergies.   If there s any chance you are pregnant.   If you are breastfeeding.   If you have any special needs.  You may have contrast for this exam. To prepare:   Do not eat or drink for 2 hours before your exam. If you need to take medicine, you may take it with small sips of water. (We may ask you to take liquid medicine as well.)   The day before your exam, drink extra fluids at least six 8-ounce glasses (unless your doctor tells you to restrict your fluids).  Patients over 70 or patients with diabetes or kidney problems:   If you haven t had a blood test (creatinine test) within the last 30 days, go to your clinic or Diagnostic Imaging Department for this test.  If you have diabetes:   If your kidney function is normal, continue taking your metformin (Avandamet, Glucophage, Glucovance, Metaglip) on the day of your exam.   If your kidney function is abnormal, wait 48 hours before restarting this medicine.  You will have oral contrast for this exam:   You will drink the contrast at home. Get this from your clinic or Diagnostic Imaging Department. Please follow the directions given.  Please wear loose clothing, such as a sweat suit or jogging clothes. Avoid snaps, zippers and other metal. We may ask you to undress and put on a hospital gown.  If you have any questions, please call the Imaging Department where you will have your exam.              Future tests that were ordered for you today     Open Future Orders        Priority Expected Expires Ordered    CT Pelvis w Contrast  "Routine  1/23/2018 1/23/2017            Who to contact     If you have questions or need follow up information about today's clinic visit or your schedule please contact [facility] directly at [phone].  Normal or non-critical lab and imaging results will be communicated to you by MyChart, letter or phone within 4 business days after the clinic has received the results. If you do not hear from us within 7 days, please contact the clinic through Archivehart or phone. If you have a critical or abnormal lab result, we will notify you by phone as soon as possible.  Submit refill requests through Vendavo or call your pharmacy and they will forward the refill request to us. Please allow 3 business days for your refill to be completed.          Additional Information About Your Visit        ArchiveharMojoPages Information     Vendavo gives you secure access to your electronic health record. If you see a primary care provider, you can also send messages to your care team and make appointments. If you have questions, please call your primary care clinic.  If you do not have a primary care provider, please call [phone] and they will assist you.        Care EveryWhere ID     This is your Care EveryWhere ID. This could be used by other organizations to access your [facility] medical records  [id number]        Your Vitals Were     Pulse Temperature Respirations Height BMI (Body Mass Index) Pulse Oximetry    82 98.1  F (36.7  C) (Tympanic) 16 1.6 m (5' 3\") 21.26 kg/m2 97%    Breastfeeding?                   No            Blood Pressure from Last 3 Encounters:   01/23/17 130/81   11/18/16 150/85   11/19/15 130/78    Weight from Last 3 Encounters:   01/23/17 54.432 kg (120 lb)   11/18/16 54.885 kg (121 lb)   11/19/15 56.7 kg (125 lb)              We Performed the Following     Basic metabolic panel          Today's Medication Changes          These changes are accurate as of: 1/23/17  9:51 AM.  If you have any questions, ask your " nurse or doctor.               Start taking these medicines.        Dose/Directions    acetaminophen-codeine 300-30 MG per tablet   Commonly known as:  TYLENOL #3   Used for:  Pain in joint involving left pelvic region and thigh, Malignant neoplasm of upper lobe of left lung (H)   Started by:  Zarina Duncan MD        Dose:  1 tablet   Take 1 tablet by mouth every 4 hours as needed for pain maximum 10  tablet(s) per day   Quantity:  90 tablet   Refills:  0         These medicines have changed or have updated prescriptions.        Dose/Directions    oseltamivir 75 MG capsule   Commonly known as:  TAMIFLU   This may have changed:  additional instructions   Used for:  Chronic obstructive pulmonary disease, unspecified COPD type (H)        Dose:  75 mg   Take 1 capsule (75 mg) by mouth daily   Quantity:  10 capsule   Refills:  0            Where to get your medicines      Some of these will need a paper prescription and others can be bought over the counter.  Ask your nurse if you have questions.     Bring a paper prescription for each of these medications    - acetaminophen-codeine 300-30 MG per tablet             Primary Care Provider Office Phone # Fax #    Amarilis Almaguer PA-C [phone]       [facility] [address] [place] [address]        Thank you!     Thank you for choosing [facility]  for your care. Our goal is always to provide you with excellent care. Hearing back from our patients is one way we can continue to improve our services. Please take a few minutes to complete the written survey that you may receive in the mail after your visit with us. Thank you!             Your Updated Medication List - Protect others around you: Learn how to safely use, store and throw away your medicines at www.disposemymeds.org.          This list is accurate as of: 1/23/17  9:51 AM.  Always use your most recent med list.                   Brand Name Dispense Instructions for use     acetaminophen-codeine 300-30 MG per tablet    TYLENOL #3    90 tablet    Take 1 tablet by mouth every 4 hours as needed for pain maximum 10  tablet(s) per day       ADVIL PO      Take 400 mg by mouth every 6 hours       azithromycin 250 MG tablet    ZITHROMAX    6 tablet    Two tablets first day, then one tablet daily for four days.  Can repeat immediately if you desire.       budesonide 180 MCG/ACT inhaler    PULMICORT FLEXHALER    1 Inhaler    Inhale 2 puffs into the lungs 2 times daily       COLACE 100 MG capsule   Generic drug:  docusate sodium     60    1 CAPSULE ORALLY DAILY AS NEEDED       * ipratropium - albuterol 0.5 mg/2.5 mg/3 mL 0.5-2.5 (3) MG/3ML neb solution    DUONEB    1 Box    Take 1 vial (3 mLs) by nebulization 4 times daily       * Ipratropium-Albuterol  MCG/ACT inhaler    COMBIVENT RESPIMAT    1 Inhaler    Inhale 1 puff into the lungs 4 times daily 2-3 Puffs every 6 hours.       * order for DME      Oxygen 2.0 -4.0 liters n/c continuous       * order for DME     1 Device    NEBULIZER machine       oseltamivir 75 MG capsule    TAMIFLU    10 capsule    Take 1 capsule (75 mg) by mouth daily       predniSONE 20 MG tablet    DELTASONE    14 tablet    Take 2 pills (40 mg) daily x 3 days, then 1.5 pills (30 mg) daily x 3 days, then 1 pill (20 mg) daily x 3 days, then 1/2 pill (10 mg) daily x 3 days.       ranitidine 150 MG tablet    ZANTAC    60 tablet    Take 1 tab 1-2 times per day.       TYLENOL PM EXTRA STRENGTH PO      Take 2 tablets by mouth every 6 hours For pain       vitamin D 1000 UNITS capsule      Take 2 capsules by mouth daily       * Notice:  This list has 4 medication(s) that are the same as other medications prescribed for you. Read the directions carefully, and ask your doctor or other care provider to review them with you.

## 2017-01-23 NOTE — NURSING NOTE
"Alethea Britt is a 76 year old female who presents for:  Chief Complaint   Patient presents with     Oncology Clinic Visit     New Patient - Lung CA        Initial Vitals:  /81 mmHg  Pulse 82  Temp(Src) 98.1  F (36.7  C) (Tympanic)  Resp 16  Ht 1.6 m (5' 3\")  Wt 54.432 kg (120 lb)  BMI 21.26 kg/m2  SpO2 97%  Breastfeeding? No Estimated body mass index is 21.26 kg/(m^2) as calculated from the following:    Height as of this encounter: 1.6 m (5' 3\").    Weight as of this encounter: 54.432 kg (120 lb).. Body surface area is 1.55 meters squared. BP completed using cuff size: regular  Moderate Pain (5) No LMP recorded. Patient is postmenopausal. Allergies and medications reviewed.     Medications: Medication refills not needed today.  Pharmacy name entered into [email or web address]:    [facility] [place] [id number] [place] [address]    Comments: New Patient - Lung CA. Patient reports dx in 2012 by PET scan , NO biopsy taken.     10  minutes for nursing intake (face to face time)   Trang Brown CMA          "

## 2017-01-27 NOTE — TELEPHONE ENCOUNTER
Patient calling to clarify CT scan as being without CT.  Chart review and original order was for with and then changed to without in same clinic encounter.  Nothing noted in dictation or other about with or without contrast.  Reviewed with patient.  Patient asking when released to my chart.  Reviewed process with MD reviewing and releasing.  Patient does have Radiation Oncology Consult next week to review.  No other questions or concerns.    Matthew Gregorio, RN, BSN, OCN  Oncology Care Coordinator RN  [facility]  [phone]  1/27/2017, 3:07 PM

## 2017-01-30 NOTE — PATIENT INSTRUCTIONS
Take medication as directed.  Continue your Tylenol #3 that you currently take to help with any pain.      Follow up with your primary care provider in 4 days and sooner if needed.      Indications for emergent return to emergency department discussed with patient, who verbalized good understanding and agreement.  Patient understands the limitations of today's evaluation.         Shingles  Shingles is a viral infection caused by the same virus as chicken pox. Anyone who has had chicken pox may get shingles later in life. The virus stays in the body, but remains dormant (asleep). Shingles often occurs in older persons or persons with lowered immunity. But it can affect anyone at any age.  Shingles starts as a tingling patch of skin on one side of the body. Small, painful blisters may then appear. The rash does not spread to the rest of the body.  Exposure to shingles cannot cause shingles. However, it can cause chicken pox in anyone who has not had chicken pox or has not been vaccinated. The contagious period ends when all blisters have crusted over (generally about 2 weeks after the illness begins).  After the blisters heal, the affected skin may be sensitive or painful for months (neuralgia). This often gradually goes away.  A shingles vaccine is available. This can help prevent shingles or make it less painful. It is generally recommended for adults over the age of 60 who have had chicken pox in the past, but who have never had shingles. Adults over 60 who have had neither chicken pox nor shingles can prevent both diseases with the chicken pox vaccine. Ask your healthcare provider about these vaccines.  Home care    Medicines may be prescribed to help relieve pain. Take these medicines as directed. Ask your healthcare provider or pharmacist before using over-the-counter medicines for helping treat pain and itching.     In certain cases, antiviral medicines may be prescribed to reduce pain, shorten the illness, and  prevent neuralgia. Take these medicines as directed.    Compresses made from a solution of cool water mixed with cornstarch or baking soda may help relieve pain and itching.     Gently wash skin daily with soap and water to help prevent infection.  Be certain to rinse off all of the soap, which can be irritating.    Trim fingernails and try not to scratch. Scratching the sores may leave scars.    Stay home from work or school until all blisters have formed a crust and you are no longer contagious.  Follow-up care  Follow up with your healthcare provider or as directed by our staff.  When to seek medical advice    Fever of 100.4 F (38 C) or higher, or as directed by your healthcare provider    Affected skin is on the face or neck and any of the following occur:                          Headache                          Eye pain                          Changes in vision                          Sores near the eye                          Weakness of facial muscles    Pain, redness, or swelling of a joint    Signs of skin infection: colored drainage from the sores, warmth, increasing redness, or increasing pain    5979-3882 [employer]. [address] [place] [address]. All rights reserved. This information is not intended as a substitute for professional medical care. Always follow your healthcare professional's instructions.        Shingles (Herpes Zoster)  Shingles is also called herpes zoster. It is a painful skin rash caused by the herpes zoster virus. This is the same virus that causes chickenpox. After a person has chickenpox, the virus remains inactive in the nerve cells. Years later, the virus can become active again and travel to the skin. Most people have shingles only once, but it is possible to have it more than once.  What are the risk factors for shingles?  Anyone who has had chickenpox can develop shingles. But your risk is greater if you:    Are 50 years of age or older.    Have  an illness that weakens your immune system, such as HIV/AIDS.    Have cancer, especially Hodgkin disease or lymphoma.    Take medications that weaken your immune system.  What are the symptoms of shingles?     The shingles rash usually appears on just one side of the body.     The first sign of shingles is usually pain, burning, tingling, or itching on one part of your face or body. You may also feel as if you have the flu, with fever and chills.    A red rash with small blisters appears within a few days. The rash may appear as follows:      The blisters can occur anywhere, but they re most common on the back, chest, or abdomen.    They usually appear on only one side of the body, spreading along the nerve pathway where the virus was inactive.     The rash can also form around an eye, along one side of the face or neck, or in the mouth.    In a few people, usually those with weakened immune systems, shingles appear on more than one part of the body at once.    After a few days, the blisters become dry and form a crust. The crust falls off in days to weeks. The blisters generally do not leave scars.  How is shingles treated?  For most people, shingles heals on its own in a few weeks. But treatment is recommended to help relieve pain, speed healing, and reduce the risk of complications. Antiviral medications are prescribed within the first 72 hours of the appearance of the rash. To lessen symptoms:    Apply ice packs (wrapped in a thin towel), cool compresses,  or soak in a cool bath.    Use calamine lotion to calm itchy skin.    Ask your health care provider about over-the-counter pain relievers. If your pain is severe, your provider may prescribe stronger pain medications.  What are the complications of shingles?  Shingles often goes away with no lasting effects. But some people have serious problems long after the blisters have healed:    Postherpetic neuralgia. This is severe nerve pain that lasts for months, or  even years after you have shingles. Medications can be prescribed to help relieve the pain and improve quality of life.    Bacterial infection. Shingles blisters may become infected with bacteria. Antibiotic medication is used to treat the infection.    Eye problems. A person with shingles on the face should see his or her health care provider right away. Shingles can cause serious problems with vision, and even blindness.  When to seek medical care  Contact your health care provider if you experience any of the following:    Symptoms that don t go away with treatment.    A rash or blisters near your eye.    Increased drainage, fever, or rash after treatment, or severe pain that doesn t go away.   How can shingles be prevented?  You can only get shingles if you have had chicken pox in the past. Those who have never had chickenpox can get the virus from you. Although instead of developing shingles, the person may get chickenpox. Until your blisters form scabs, avoid contact with others, especially the following:    Pregnant women who have never had chickenpox or the vaccine    Infants who were born early (prematurely) or who had low weight at birth    People with weak immune system (for example, people receiving chemotherapy for cancer, people who have had organ transplants, or people with HIV infections)     The shingles vaccine  If you re 60 years of age or older , ask your health care provider if you should receive the shingles vaccine. The vaccine makes it less likely that you will develop shingles. If you do develop shingles, your symptoms will likely be milder than if you hadn t been vaccinated. Note: Although the vaccine is licensed for people 50 years of age or older, the CDC does not recommend the vaccine for those who are 50 to 59 years old.     3638-7784 [employer]. [address] [place] [address], [place] [address]. All rights reserved. This information is not intended as a substitute for  professional medical care. Always follow your healthcare professional's instructions.

## 2017-01-30 NOTE — MR AVS SNAPSHOT
After Visit Summary   1/30/2017    Alethea Britt    MRN: [id number]           Patient Information     Date Of Birth          1940        Visit Information        Provider Department      1/30/2017 4:00 PM Fior Garcia APRN CNP [facility]        Today's Diagnoses     Herpes zoster without complication    -  1       Care Instructions    Take medication as directed.  Continue your Tylenol #3 that you currently take to help with any pain.      Follow up with your primary care provider in 4 days and sooner if needed.      Indications for emergent return to emergency department discussed with patient, who verbalized good understanding and agreement.  Patient understands the limitations of today's evaluation.         Shingles  Shingles is a viral infection caused by the same virus as chicken pox. Anyone who has had chicken pox may get shingles later in life. The virus stays in the body, but remains dormant (asleep). Shingles often occurs in older persons or persons with lowered immunity. But it can affect anyone at any age.  Shingles starts as a tingling patch of skin on one side of the body. Small, painful blisters may then appear. The rash does not spread to the rest of the body.  Exposure to shingles cannot cause shingles. However, it can cause chicken pox in anyone who has not had chicken pox or has not been vaccinated. The contagious period ends when all blisters have crusted over (generally about 2 weeks after the illness begins).  After the blisters heal, the affected skin may be sensitive or painful for months (neuralgia). This often gradually goes away.  A shingles vaccine is available. This can help prevent shingles or make it less painful. It is generally recommended for adults over the age of 60 who have had chicken pox in the past, but who have never had shingles. Adults over 60 who have had neither chicken pox nor shingles can prevent both diseases with the  chicken pox vaccine. Ask your healthcare provider about these vaccines.  Home care    Medicines may be prescribed to help relieve pain. Take these medicines as directed. Ask your healthcare provider or pharmacist before using over-the-counter medicines for helping treat pain and itching.     In certain cases, antiviral medicines may be prescribed to reduce pain, shorten the illness, and prevent neuralgia. Take these medicines as directed.    Compresses made from a solution of cool water mixed with cornstarch or baking soda may help relieve pain and itching.     Gently wash skin daily with soap and water to help prevent infection.  Be certain to rinse off all of the soap, which can be irritating.    Trim fingernails and try not to scratch. Scratching the sores may leave scars.    Stay home from work or school until all blisters have formed a crust and you are no longer contagious.  Follow-up care  Follow up with your healthcare provider or as directed by our staff.  When to seek medical advice    Fever of 100.4 F (38 C) or higher, or as directed by your healthcare provider    Affected skin is on the face or neck and any of the following occur:                          Headache                          Eye pain                          Changes in vision                          Sores near the eye                          Weakness of facial muscles    Pain, redness, or swelling of a joint    Signs of skin infection: colored drainage from the sores, warmth, increasing redness, or increasing pain    9497-0115 [employer]. [address] [place] [address], [place] [address]. All rights reserved. This information is not intended as a substitute for professional medical care. Always follow your healthcare professional's instructions.        Shingles (Herpes Zoster)  Shingles is also called herpes zoster. It is a painful skin rash caused by the herpes zoster virus. This is the same virus that causes chickenpox. After a  person has chickenpox, the virus remains inactive in the nerve cells. Years later, the virus can become active again and travel to the skin. Most people have shingles only once, but it is possible to have it more than once.  What are the risk factors for shingles?  Anyone who has had chickenpox can develop shingles. But your risk is greater if you:    Are 50 years of age or older.    Have an illness that weakens your immune system, such as HIV/AIDS.    Have cancer, especially Hodgkin disease or lymphoma.    Take medications that weaken your immune system.  What are the symptoms of shingles?     The shingles rash usually appears on just one side of the body.     The first sign of shingles is usually pain, burning, tingling, or itching on one part of your face or body. You may also feel as if you have the flu, with fever and chills.    A red rash with small blisters appears within a few days. The rash may appear as follows:      The blisters can occur anywhere, but they re most common on the back, chest, or abdomen.    They usually appear on only one side of the body, spreading along the nerve pathway where the virus was inactive.     The rash can also form around an eye, along one side of the face or neck, or in the mouth.    In a few people, usually those with weakened immune systems, shingles appear on more than one part of the body at once.    After a few days, the blisters become dry and form a crust. The crust falls off in days to weeks. The blisters generally do not leave scars.  How is shingles treated?  For most people, shingles heals on its own in a few weeks. But treatment is recommended to help relieve pain, speed healing, and reduce the risk of complications. Antiviral medications are prescribed within the first 72 hours of the appearance of the rash. To lessen symptoms:    Apply ice packs (wrapped in a thin towel), cool compresses,  or soak in a cool bath.    Use calamine lotion to calm itchy  skin.    Ask your health care provider about over-the-counter pain relievers. If your pain is severe, your provider may prescribe stronger pain medications.  What are the complications of shingles?  Shingles often goes away with no lasting effects. But some people have serious problems long after the blisters have healed:    Postherpetic neuralgia. This is severe nerve pain that lasts for months, or even years after you have shingles. Medications can be prescribed to help relieve the pain and improve quality of life.    Bacterial infection. Shingles blisters may become infected with bacteria. Antibiotic medication is used to treat the infection.    Eye problems. A person with shingles on the face should see his or her health care provider right away. Shingles can cause serious problems with vision, and even blindness.  When to seek medical care  Contact your health care provider if you experience any of the following:    Symptoms that don t go away with treatment.    A rash or blisters near your eye.    Increased drainage, fever, or rash after treatment, or severe pain that doesn t go away.   How can shingles be prevented?  You can only get shingles if you have had chicken pox in the past. Those who have never had chickenpox can get the virus from you. Although instead of developing shingles, the person may get chickenpox. Until your blisters form scabs, avoid contact with others, especially the following:    Pregnant women who have never had chickenpox or the vaccine    Infants who were born early (prematurely) or who had low weight at birth    People with weak immune system (for example, people receiving chemotherapy for cancer, people who have had organ transplants, or people with HIV infections)     The shingles vaccine  If you re 60 years of age or older , ask your health care provider if you should receive the shingles vaccine. The vaccine makes it less likely that you will develop shingles. If you do develop  shingles, your symptoms will likely be milder than if you hadn t been vaccinated. Note: Although the vaccine is licensed for people 50 years of age or older, the CDC does not recommend the vaccine for those who are 50 to 59 years old.     1901-2648 [employer]. [address] [place] [address]. All rights reserved. This information is not intended as a substitute for professional medical care. Always follow your healthcare professional's instructions.              Follow-ups after your visit        Follow-up notes from your care team     See patient instructions section of the AVS Return in about 4 days (around 2/3/2017), or if symptoms worsen or fail to improve, for Follow up with your primary care provider.      Your next 10 appointments already scheduled     Jan 31, 2017  1:30 PM   CONSULT with Aidee Seo MD   [facility]    [address], [address]  [facility] [place] [address]   [phone]            Jan 31, 2017  3:30 PM   SIMULATION with Aidee Seo MD, [facility]   [facility]    [address], [address]  [facility] [place] [address]   [phone]              Who to contact     If you have questions or need follow up information about today's clinic visit or your schedule please contact [facility] directly at [phone].  Normal or non-critical lab and imaging results will be communicated to you by MyChart, letter or phone within 4 business days after the clinic has received the results. If you do not hear from us within 7 days, please contact the clinic through MyChart or phone. If you have a critical or abnormal lab result, we will notify you by phone as soon as possible.  Submit refill requests through [employer] or call your pharmacy and they will forward the refill request to us. Please allow 3 business days for your refill to be completed.          Additional Information About Your  Visit        [employer] Information     [employer] gives you secure access to your electronic health record. If you see a primary care provider, you can also send messages to your care team and make appointments. If you have questions, please call your primary care clinic.  If you do not have a primary care provider, please call [phone] and they will assist you.        Care EveryWhere ID     This is your Care EveryWhere ID. This could be used by other organizations to access your [place] medical records  [id number]        Your Vitals Were     Pulse Temperature Pulse Oximetry             96 97  F (36.1  C) (Tympanic) 98%          Blood Pressure from Last 3 Encounters:   01/30/17 180/68   01/23/17 130/81   11/18/16 150/85    Weight from Last 3 Encounters:   01/30/17 120 lb 6.4 oz (54.613 kg)   01/23/17 120 lb (54.432 kg)   11/18/16 121 lb (54.885 kg)              Today, you had the following     No orders found for display         Today's Medication Changes          These changes are accurate as of: 1/30/17  4:26 PM.  If you have any questions, ask your nurse or doctor.               Start taking these medicines.        Dose/Directions    valACYclovir 1000 mg tablet   Commonly known as:  VALTREX   Used for:  Herpes zoster without complication   Started by:  Fior Garcia APRN CNP        Dose:  1000 mg   Take 1 tablet (1,000 mg) by mouth 3 times daily for 7 days   Quantity:  21 tablet   Refills:  0         These medicines have changed or have updated prescriptions.        Dose/Directions    oseltamivir 75 MG capsule   Commonly known as:  TAMIFLU   This may have changed:  additional instructions   Used for:  Chronic obstructive pulmonary disease, unspecified COPD type (H)        Dose:  75 mg   Take 1 capsule (75 mg) by mouth daily   Quantity:  10 capsule   Refills:  0            Where to get your medicines      These medications were sent to [facility] [id number]  [address] [place] [address]     Phone:  [phone]    - valACYclovir 1000 mg tablet             Primary Care Provider Office Phone # Fax #    Amarilis Almaguer PA-C [phone]       [address] [address]        Thank you!     Thank you for choosing [facility]  for your care. Our goal is always to provide you with excellent care. Hearing back from our patients is one way we can continue to improve our services. Please take a few minutes to complete the written survey that you may receive in the mail after your visit with us. Thank you!             Your Updated Medication List - Protect others around you: Learn how to safely use, store and throw away your medicines at www.disposemymeds.org.          This list is accurate as of: 1/30/17  4:26 PM.  Always use your most recent med list.                   Brand Name Dispense Instructions for use    acetaminophen-codeine 300-30 MG per tablet    TYLENOL #3    90 tablet    Take 1 tablet by mouth every 4 hours as needed for pain maximum 10  tablet(s) per day       ADVIL PO      Take 400 mg by mouth every 6 hours       azithromycin 250 MG tablet    ZITHROMAX    6 tablet    Two tablets first day, then one tablet daily for four days.  Can repeat immediately if you desire.       budesonide 180 MCG/ACT inhaler    PULMICORT FLEXHALER    1 Inhaler    Inhale 2 puffs into the lungs 2 times daily       COLACE 100 MG capsule   Generic drug:  docusate sodium     60    1 CAPSULE ORALLY DAILY AS NEEDED       * ipratropium - albuterol 0.5 mg/2.5 mg/3 mL 0.5-2.5 (3) MG/3ML neb solution    DUONEB    1 Box    Take 1 vial (3 mLs) by nebulization 4 times daily       * Ipratropium-Albuterol  MCG/ACT inhaler    COMBIVENT RESPIMAT    1 Inhaler    Inhale 1 puff into the lungs 4 times daily 2-3 Puffs every 6 hours.       * order for DME      Oxygen 2.0 -4.0 liters n/c continuous       * order for  DME     1 Device    NEBULIZER machine       oseltamivir 75 MG capsule    TAMIFLU    10 capsule    Take 1 capsule (75 mg) by mouth daily       predniSONE 20 MG tablet    DELTASONE    14 tablet    Take 2 pills (40 mg) daily x 3 days, then 1.5 pills (30 mg) daily x 3 days, then 1 pill (20 mg) daily x 3 days, then 1/2 pill (10 mg) daily x 3 days.       ranitidine 150 MG tablet    ZANTAC    60 tablet    Take 1 tab 1-2 times per day.       TYLENOL PM EXTRA STRENGTH PO      Take 2 tablets by mouth every 6 hours For pain       valACYclovir 1000 mg tablet    VALTREX    21 tablet    Take 1 tablet (1,000 mg) by mouth 3 times daily for 7 days       vitamin D 1000 UNITS capsule      Take 2 capsules by mouth daily       * Notice:  This list has 4 medication(s) that are the same as other medications prescribed for you. Read the directions carefully, and ask your doctor or other care provider to review them with you.

## 2017-01-30 NOTE — PROGRESS NOTES
SUBJECTIVE:                                                    Alethea Britt is a 76 year old female who presents to clinic today for the following health issues:      Concern - Shingles      Onset: started today      Description:   On her stomach towards her left side and back     Intensity: moderate, severe    Progression of Symptoms:  Keeps spreading throughout the day     Accompanying Signs & Symptoms:  Look like red patches        Previous history of similar problem:   no    Precipitating factors:   Worsened by: no     Alleviating factors:  Improved by: no        Therapies Tried and outcome: has not tried anything since it just started today           Problem list and histories reviewed & adjusted, as indicated.  Additional history: as documented    Patient Active Problem List   Diagnosis     Osteoporosis     Chronic airway obstruction (H)     Esophageal reflux     Lumbago     Disturbance of skin sensation     GUILLIAN BARRE     Hyperlipidemia LDL goal <160     z-Advanced directives, counseling/discussion     Health Care Home     Lung cancer     Weakness and numbness     CIDP (chronic inflammatory demyelinating polyneuropathy) (H)     Vitamin B12 deficiency disease     Health care maintenance -- DO NOT CALL     Anisocoria     COPD (chronic obstructive pulmonary disease) (H)     Past Surgical History   Procedure Laterality Date     Surgical history of -   12/05     Normal  Colonoscopy     Biopsy  9/2011     BCC - Right jawline & left medial arm     Eye surgery  12/2011     left eyelid reconstruction       Social History   Substance Use Topics     Smoking status: Former Smoker -- 1.00 packs/day for 35 years     Types: Cigarettes     Quit date: 05/13/1998     Smokeless tobacco: Never Used     Alcohol Use: Yes      Comment: glass/day which reduces my hand tremers     Family History   Problem Relation Age of Onset     C.A.D. Sister      MI age 40     HEART DISEASE Sister      DIABETES Maternal Grandmother       CEREBROVASCULAR DISEASE Maternal Grandmother      Alcohol/Drug Maternal Grandmother      Hypertension Father      Alcohol/Drug Father      Depression Father      Lipids Father      Respiratory Father      copd     Hypertension Mother      Alcohol/Drug Mother      GASTROINTESTINAL DISEASE Mother      reflux/bowel ressection/polyps     Respiratory Mother      asthma     Thyroid Disease Mother      CANCER Mother      lung cancer     Eye Disorder Mother      macular degeneration     Neurologic Disorder Mother      hand tremorers     Circulatory Mother      HEART DISEASE Mother      Alcohol/Drug Paternal Grandmother      Alcohol/Drug Maternal Grandfather      Alcohol/Drug Brother      CANCER Brother      skin ca     Neurologic Disorder Brother      hand tremors     Cancer - colorectal No family hx of      Breast Cancer No family hx of      Hyperlipidemia Mother      Hyperlipidemia Father      Other Cancer Mother      Depression Brother      Substance Abuse Brother      Thyroid Disease Daughter      Depression Brother      Substance Abuse Brother      Thyroid Disease Daughter          Current Outpatient Prescriptions   Medication Sig Dispense Refill     valACYclovir (VALTREX) 1000 mg tablet Take 1 tablet (1,000 mg) by mouth 3 times daily for 7 days 21 tablet 0     Diphenhydramine-APAP, sleep, (TYLENOL PM EXTRA STRENGTH PO) Take 2 tablets by mouth every 6 hours For pain       Ibuprofen (ADVIL PO) Take 400 mg by mouth every 6 hours       acetaminophen-codeine (TYLENOL #3) 300-30 MG per tablet Take 1 tablet by mouth every 4 hours as needed for pain maximum 10  tablet(s) per day 90 tablet 0     ipratropium - albuterol 0.5 mg/2.5 mg/3 mL (DUONEB) 0.5-2.5 (3) MG/3ML nebulization Take 1 vial (3 mLs) by nebulization 4 times daily 1 Box 5     budesonide (PULMICORT FLEXHALER) 180 MCG/ACT inhaler Inhale 2 puffs into the lungs 2 times daily 1 Inhaler 11     Ipratropium-Albuterol (COMBIVENT RESPIMAT)  MCG/ACT inhaler Inhale 1  puff into the lungs 4 times daily 2-3 Puffs every 6 hours. 1 Inhaler 11     ranitidine (ZANTAC) 150 MG tablet Take 1 tab 1-2 times per day. 60 tablet 11     predniSONE (DELTASONE) 20 MG tablet Take 2 pills (40 mg) daily x 3 days, then 1.5 pills (30 mg) daily x 3 days, then 1 pill (20 mg) daily x 3 days, then 1/2 pill (10 mg) daily x 3 days. 14 tablet 1     azithromycin (ZITHROMAX) 250 MG tablet Two tablets first day, then one tablet daily for four days.  Can repeat immediately if you desire. 6 tablet 3     Cholecalciferol (VITAMIN D) 1000 UNITS capsule Take 2 capsules by mouth daily        ORDER FOR DME NEBULIZER machine 1 Device 0     ORDER FOR DME Oxygen 2.0 -4.0 liters n/c continuous       COLACE 100 MG OR CAPS 1 CAPSULE ORALLY DAILY AS NEEDED 60 0     oseltamivir (TAMIFLU) 75 MG capsule Take 1 capsule (75 mg) by mouth daily (Patient taking differently: Take 75 mg by mouth daily As needed) 10 capsule 0     Allergies   Allergen Reactions     Vfend [Voriconazole] Other (See Comments)     Numbness and tremors     Alendronic Acid Nausea     Other reaction(s): Headache     Amoxicillin-Pot Clavulanate Nausea     Bactrim [Sulfamethoxazole W-Trimethoprim] Other (See Comments) and Difficulty breathing     And bloody nose     Boniva [Ibandronate Sodium] Nausea     Clarithromycin      Other reaction(s): Insomnia     Fosamax Other (See Comments)     headache     Ibandronic Acid Nausea     Other reaction(s): Headache     Paroxetine      Other reaction(s): Tremors  Increased hand tremors     Raloxifene Nausea     Other reaction(s): Headache     Raloxifene Hcl [Raloxifene Hydrochloride] Other (See Comments)     Headache     Tetracycline Nausea and Vomiting     Augmentin Nausea     Biaxin [Clarithromycin] Other (See Comments)     Insomnia     Paroxetine Other (See Comments)     Paxil. Possible increased tremors.     Quinolones Other (See Comments)     Avelox- bad taste in mouth, possible increased tremor  Cipro- bad taste in  mouth     Tetracyclines Nausea and Vomiting     Problem list, Medication list, Allergies, and Medical/Social/Surgical histories reviewed in [facility] and updated as appropriate.    ROS:  Constitutional, HEENT, cardiovascular, pulmonary, GI, , musculoskeletal, neuro, skin, endocrine and psych systems are negative, except as otherwise noted.    OBJECTIVE:                                                    /68 mmHg  Pulse 96  Temp(Src) 97  F (36.1  C) (Tympanic)  Wt 120 lb 6.4 oz (54.613 kg)  SpO2 98%  Body mass index is 21.33 kg/(m^2).  GENERAL: healthy, alert and no distress, nontoxic in appearance, on O2 and very shakey. Here with .  EYES: Eyes grossly normal to inspection,  conjunctivae and sclerae normal  HENT: normocephalic  NECK: supple with full ROM  ABDOMEN: soft, nontender  MS: no gross musculoskeletal defects noted, no edema  Has pink small 2 cm circular lesions starting on left side of umbilicus and going up left side around to back just of left of spine. About 10 lesions total with a couple starting to open slightly.    Diagnostic Test Results:  No results found for this or any previous visit (from the past 24 hour(s)).     ASSESSMENT/PLAN:                                                      Problem List Items Addressed This Visit     None      Visit Diagnoses     Herpes zoster without complication    -  Primary     Relevant Medications     valACYclovir (VALTREX) 1000 mg tablet                Patient Instructions     Take medication as directed.  Continue your Tylenol #3 that you currently take to help with any pain.      Follow up with your primary care provider in 4 days and sooner if needed.      Indications for emergent return to emergency department discussed with patient, who verbalized good understanding and agreement.  Patient understands the limitations of today's evaluation.         Shingles  Shingles is a viral infection caused by the same virus as chicken pox. Anyone who has had  chicken pox may get shingles later in life. The virus stays in the body, but remains dormant (asleep). Shingles often occurs in older persons or persons with lowered immunity. But it can affect anyone at any age.  Shingles starts as a tingling patch of skin on one side of the body. Small, painful blisters may then appear. The rash does not spread to the rest of the body.  Exposure to shingles cannot cause shingles. However, it can cause chicken pox in anyone who has not had chicken pox or has not been vaccinated. The contagious period ends when all blisters have crusted over (generally about 2 weeks after the illness begins).  After the blisters heal, the affected skin may be sensitive or painful for months (neuralgia). This often gradually goes away.  A shingles vaccine is available. This can help prevent shingles or make it less painful. It is generally recommended for adults over the age of 60 who have had chicken pox in the past, but who have never had shingles. Adults over 60 who have had neither chicken pox nor shingles can prevent both diseases with the chicken pox vaccine. Ask your healthcare provider about these vaccines.  Home care    Medicines may be prescribed to help relieve pain. Take these medicines as directed. Ask your healthcare provider or pharmacist before using over-the-counter medicines for helping treat pain and itching.     In certain cases, antiviral medicines may be prescribed to reduce pain, shorten the illness, and prevent neuralgia. Take these medicines as directed.    Compresses made from a solution of cool water mixed with cornstarch or baking soda may help relieve pain and itching.     Gently wash skin daily with soap and water to help prevent infection.  Be certain to rinse off all of the soap, which can be irritating.    Trim fingernails and try not to scratch. Scratching the sores may leave scars.    Stay home from work or school until all blisters have formed a crust and you are  no longer contagious.  Follow-up care  Follow up with your healthcare provider or as directed by our staff.  When to seek medical advice    Fever of 100.4 F (38 C) or higher, or as directed by your healthcare provider    Affected skin is on the face or neck and any of the following occur:                          Headache                          Eye pain                          Changes in vision                          Sores near the eye                          Weakness of facial muscles    Pain, redness, or swelling of a joint    Signs of skin infection: colored drainage from the sores, warmth, increasing redness, or increasing pain    4655-4624 [employer]. [address] [place] [id number]. All rights reserved. This information is not intended as a substitute for professional medical care. Always follow your healthcare professional's instructions.        Shingles (Herpes Zoster)  Shingles is also called herpes zoster. It is a painful skin rash caused by the herpes zoster virus. This is the same virus that causes chickenpox. After a person has chickenpox, the virus remains inactive in the nerve cells. Years later, the virus can become active again and travel to the skin. Most people have shingles only once, but it is possible to have it more than once.  What are the risk factors for shingles?  Anyone who has had chickenpox can develop shingles. But your risk is greater if you:    Are 50 years of age or older.    Have an illness that weakens your immune system, such as HIV/AIDS.    Have cancer, especially Hodgkin disease or lymphoma.    Take medications that weaken your immune system.  What are the symptoms of shingles?     The shingles rash usually appears on just one side of the body.     The first sign of shingles is usually pain, burning, tingling, or itching on one part of your face or body. You may also feel as if you have the flu, with fever and chills.    A red rash with small  blisters appears within a few days. The rash may appear as follows:      The blisters can occur anywhere, but they re most common on the back, chest, or abdomen.    They usually appear on only one side of the body, spreading along the nerve pathway where the virus was inactive.     The rash can also form around an eye, along one side of the face or neck, or in the mouth.    In a few people, usually those with weakened immune systems, shingles appear on more than one part of the body at once.    After a few days, the blisters become dry and form a crust. The crust falls off in days to weeks. The blisters generally do not leave scars.  How is shingles treated?  For most people, shingles heals on its own in a few weeks. But treatment is recommended to help relieve pain, speed healing, and reduce the risk of complications. Antiviral medications are prescribed within the first 72 hours of the appearance of the rash. To lessen symptoms:    Apply ice packs (wrapped in a thin towel), cool compresses,  or soak in a cool bath.    Use calamine lotion to calm itchy skin.    Ask your health care provider about over-the-counter pain relievers. If your pain is severe, your provider may prescribe stronger pain medications.  What are the complications of shingles?  Shingles often goes away with no lasting effects. But some people have serious problems long after the blisters have healed:    Postherpetic neuralgia. This is severe nerve pain that lasts for months, or even years after you have shingles. Medications can be prescribed to help relieve the pain and improve quality of life.    Bacterial infection. Shingles blisters may become infected with bacteria. Antibiotic medication is used to treat the infection.    Eye problems. A person with shingles on the face should see his or her health care provider right away. Shingles can cause serious problems with vision, and even blindness.  When to seek medical care  Contact your health  care provider if you experience any of the following:    Symptoms that don t go away with treatment.    A rash or blisters near your eye.    Increased drainage, fever, or rash after treatment, or severe pain that doesn t go away.   How can shingles be prevented?  You can only get shingles if you have had chicken pox in the past. Those who have never had chickenpox can get the virus from you. Although instead of developing shingles, the person may get chickenpox. Until your blisters form scabs, avoid contact with others, especially the following:    Pregnant women who have never had chickenpox or the vaccine    Infants who were born early (prematurely) or who had low weight at birth    People with weak immune system (for example, people receiving chemotherapy for cancer, people who have had organ transplants, or people with HIV infections)     The shingles vaccine  If you re 60 years of age or older , ask your health care provider if you should receive the shingles vaccine. The vaccine makes it less likely that you will develop shingles. If you do develop shingles, your symptoms will likely be milder than if you hadn t been vaccinated. Note: Although the vaccine is licensed for people 50 years of age or older, the CDC does not recommend the vaccine for those who are 50 to 59 years old.     0364-9123 [employer]. [address] [place] [address], [place] [address]. All rights reserved. This information is not intended as a substitute for professional medical care. Always follow your healthcare professional's instructions.              TITO Mcbride [facility]

## 2017-01-30 NOTE — NURSING NOTE
"Chief Complaint   Patient presents with     Shingles     /68 mmHg  Pulse 96  Temp(Src) 97  F (36.1  C) (Tympanic)  Wt 120 lb 6.4 oz (54.613 kg)  SpO2 98% Estimated body mass index is 21.33 kg/(m^2) as calculated from the following:    Height as of 1/23/17: 5' 3\" (1.6 m).    Weight as of this encounter: 120 lb 6.4 oz (54.613 kg).  bp completed using cuff size: regular      Health Maintenance that is potentially due pending provider review:  Patient told she can follow up on health maintenance with PCP        "

## 2017-01-31 PROBLEM — C79.51 BONE METASTASIS: Status: ACTIVE | Noted: 2017-01-01

## 2017-01-31 NOTE — Clinical Note
1/31/2017      RE: Alethea Britt  [address] [facility] [address]       RADIATION ONCOLOGY CONSULT NOTE      DATE OF VISIT: Jan 31, 2017    Zarina Duncan MD  [facility] ONCOLOGY  [address] [place] [address]      Dear Dr. Duncan:    Thank you very much for referring this patient. As you know Ms. Britt is a 76 year old female with a diagnosis of presumed metastatic lung cancer. She was seen today in consultation with Dr. Seo for consideration of palliative radiotherapy.       HISTORY OF PRESENT ILLNESS:  Ms. Alethea Britt is a 75yo lady with a presumed diagnosis of NSCLC (no biopsy ever done), now with a symptomatic painful metastatic lesion in the posterior left iliac. She was initially found to have a DIOR nodule on PET scan back in 2012. She reused biopsy and any further treatment at that time given her very poor respiratory status. She elected to go on hospice and was on hospice for several months. She then graduated from hospice as she seemed to be doing better without any clinical signs of tumor progression at that time. She has since been followed very conservatively and overall has been doing fairly well.     Most recently, she began complaining of left pelvic pain late last year. An MRI of the lumbar spine on 12/29/2016 demonstrated an incompletely visualized lesion in the posterior left iliac bone that was suspicious for metastatic deposit. Her pain persisted, and so she underwent further workup with a CT pelvis on 01/23/2017. This showed a 3 cm lytic lesion in the posterior left iliac bone, that was felt most likely to be metastatic lung cancer. THe lesion was amenable to biopsy but the patient is adamantly against it.     Given the above findings, she was referred to Dr. Duncan in medical oncology and is now referred to our service for consideration of palliative radiotherapy. On exam, the patient seems to be doing quite well. Her pain is currently rated a 2-3/10 as she recently took  Tylenol #3 which she states has helped a lot with the pain. Without medications, the pain is anywhere from a 4-8/10.     CHEMOTHERAPY HISTORY: NONE    PAST RADIATION THERAPY HISTORY: NONE    PREGNANCY RISK: Postmenopausal    IMPLANTED CARDIAC DEVICE: No    PAST MEDICAL/SURGICAL HISTORY:  Past Medical History   Diagnosis Date     Other dyspnea and respiratory abnormality - Dyspnea. Cardiolite normal 2002. Dobutamine echo normal 2003.      Empyema without mention of fistula - History of Strep pneomoniae pneumonia with pnemothorax and parapneumonic abcess requiring chest tube drainage 1998      Calculus of gallbladder without mention of cholecystitis or obstruction      Bell's palsy - Left 1999      Basal cell carcinoma - right jaw, left arm 9/11.       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)      COPD (chronic obstructive pulmonary disease) (H)      Past Surgical History   Procedure Laterality Date     Surgical history of - Normal  Colonoscopy  12/05     Biopsy - BCC - Right jawline & left medial arm  9/2011     Eye surgery - left eyelid reconstruction  12/2011     ALLERGIES:  Allergies as of 01/31/2017 - reviewed 01/31/2017   Allergen Reaction Noted     Vfend [voriconazole] Other (See Comments) 10/01/2012     Alendronic acid Nausea 01/23/2017     Amoxicillin-pot clavulanate Nausea 01/23/2017     Bactrim [sulfamethoxazole w-trimethoprim] Other (See Comments) and Difficulty breathing 07/09/2012     Boniva [ibandronate sodium] Nausea 01/12/2006     Clarithromycin  01/23/2017     Fosamax Other (See Comments) 05/12/2008     Ibandronic acid Nausea 01/23/2017     Paroxetine  01/23/2017     Raloxifene Nausea 01/23/2017     Raloxifene hcl [raloxifene hydrochloride] Other (See Comments) 05/12/2008     Tetracycline Nausea and Vomiting 01/23/2017     Augmentin Nausea 05/12/2005     Biaxin [clarithromycin] Other (See Comments) 05/12/2005     Paroxetine Other (See Comments) 01/07/2005      Quinolones Other (See Comments) 01/07/2005     Tetracyclines Nausea and Vomiting 01/07/2005     MEDICATIONS:  Current Outpatient Prescriptions   Medication Sig Dispense Refill     valACYclovir (VALTREX) 1000 mg tablet Take 1 tablet (1,000 mg) by mouth 3 times daily for 7 days 21 tablet 0     Diphenhydramine-APAP, sleep, (TYLENOL PM EXTRA STRENGTH PO) Take 2 tablets by mouth every 6 hours For pain       Ibuprofen (ADVIL PO) Take 400 mg by mouth every 6 hours       acetaminophen-codeine (TYLENOL #3) 300-30 MG per tablet Take 1 tablet by mouth every 4 hours as needed for pain maximum 10  tablet(s) per day 90 tablet 0     ipratropium - albuterol 0.5 mg/2.5 mg/3 mL (DUONEB) 0.5-2.5 (3) MG/3ML nebulization Take 1 vial (3 mLs) by nebulization 4 times daily 1 Box 5     budesonide (PULMICORT FLEXHALER) 180 MCG/ACT inhaler Inhale 2 puffs into the lungs 2 times daily 1 Inhaler 11     Ipratropium-Albuterol (COMBIVENT RESPIMAT)  MCG/ACT inhaler Inhale 1 puff into the lungs 4 times daily 2-3 Puffs every 6 hours. 1 Inhaler 11     ranitidine (ZANTAC) 150 MG tablet Take 1 tab 1-2 times per day. 60 tablet 11     predniSONE (DELTASONE) 20 MG tablet Take 2 pills (40 mg) daily x 3 days, then 1.5 pills (30 mg) daily x 3 days, then 1 pill (20 mg) daily x 3 days, then 1/2 pill (10 mg) daily x 3 days. 14 tablet 1     azithromycin (ZITHROMAX) 250 MG tablet Two tablets first day, then one tablet daily for four days.  Can repeat immediately if you desire. 6 tablet 3     oseltamivir (TAMIFLU) 75 MG capsule Take 1 capsule (75 mg) by mouth daily (Patient taking differently: Take 75 mg by mouth daily As needed) 10 capsule 0     Cholecalciferol (VITAMIN D) 1000 UNITS capsule Take 2 capsules by mouth daily        ORDER FOR DME NEBULIZER machine 1 Device 0     ORDER FOR DME Oxygen 2.0 -4.0 liters n/c continuous       COLACE 100 MG OR CAPS 1 CAPSULE ORALLY DAILY AS NEEDED 60 0      FAMILY HISTORY:  Family History   Problem Relation Age of  Onset     CANCER - lung cancer Mother      Cancer - colorectal No family hx of      Breast Cancer No family hx of      Other Cancer Mother      SOCIAL HISTORY:  Social History     Marital Status:      Spouse Name: N/A     Number of Children: N/A     Years of Education: N/A     Occupational History     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 Pertinent findings are noted in the HPI and are otherwise unremarkable.     PHYSICAL EXAM:  VITALS: /77 mmHg  Pulse 90  Resp 20  SpO2 94%  Breastfeeding? No  GEN: Appears well, alert, oriented, and in NAD  HEENT: NCAT, EOMI, normal conjunctiva  CV: Warm and well-perfused  RESP: Diminished breathing throughout, but without any wheezing, on 3L NC  SKIN: Normal color and turgor  NEURO: No focal deficits, CN 3-12 grossly intact, tremors in bilateral upper extremities  PSYCH: Appropriate mood and affect    ECOG PERFORMANCE STATUS: 3    IMPRESSION:  In summary, Ms. Britt is a 76 year old female who presents with presumed metastatic lung cancer with a symptomatic left iliac lesion. Lung nodule found back in 2012, and patient refused biopsy and treatment. Went on hospice, but graduated from hospice. Has not had any histologic confirmation of cancer.     RECOMMENDATION:  Given her presumed diagnosis and significant symptoms, we recommend a course of radiotherapy for palliation of her pain. We did discuss the given that we do not have any histologic confirmation of her cancer, that we may be unneccessarily treating her left iliac bone. However, our  suspicion is very high for metastatic lung cancer given her clinical history. The rationale, logistics, risks, benefits, and potential side effects of the proposed treatment were reviewed in detail. The patient had many questions during our conversation today, which were answered to the best of our ability. Informed consent was obtained and she proceeded to CT simulation for RT planning purposes. Plan for 2000 cGy in 5 fractions to her posterior left iliac / SI region.     The patient was seen and discussed with staff, Dr. Seo. Thank you for involving us in the care of this patient.  Please feel free to contact us with questions or concerns at any time.    Luis Washington MD  Resident Radiation Oncology  [facility]  Phone: [phone]        I have personally examined the patient, reviewed and edited the resident's note and agree with the plan of care.    Aidee Seo M.D., Ph.D.      Radiation Oncologist   [facility]   Radiation Therapy Center   Phone: [phone]    CC  Patient Care Team:  Amarilis Almaguer PA-C as PCP - General (Physician Assistant)  Zarina Duncan MD as MD (Oncology)

## 2017-01-31 NOTE — PROGRESS NOTES
Clinical Treatment Planning Note    This is a 76-year-old female with diagnosis of Stage IV lung cancer with clinically symptomatic left SI joint metastases.  The palliative radiation therapy is recommended to the patient. The patient is simulated today in the supine position with head rest and under knee cushion to help keep the same position during the daily radiation therapy. 2-3 fields will be used to set up radiation therapy fields to include the left SI joint with margin. I plan to give her radiation therapy at 400 cGy each fraction to a total of 2000 cGy in 5 treatments.        Aidee Seo M.D., Ph.D.      Radiation Oncologist   [facility]   Radiation Therapy Center   Phone: [phone]

## 2017-01-31 NOTE — Clinical Note
1/31/2017      RE: Alethea Britt  [address] [facility] [address]       SIMULATION NOTE:    DIAGNOSIS:  Stage IV lung cancer with clinically symptomatic left SI joint metastases.      INDICATION:  Palliative radiation therapy is recommended for patient for local control and symptomatic relief.      CONSENT:  The possible risks and side effects of radiation therapy have been discussed with the patient in detail and at great length.  Questions were answered to patient's satisfaction.  Written consent was obtained.      SIMULATION:  The patient is in the supine position with a headrest and under knee cushion to help keep same position during daily radiation therapy.  Tentative isocenter is set up in the center of the pelvic region.  We will acquire CT information to help us better locate the target and design the radiation therapy field.      BLOCKS:  Custom blocks will be drawn to minimize radiation to normal tissues and to protect normal organs, including, but not limited to, bowels, kidneys, bladder, spinal cord, bone and soft tissue.      DOSAGE:  I plan to give her radiation therapy at 400 cGy each fraction to a total of 2000 cGy in 5 treatments targeted to the left SI joint region only.        Aidee Seo M.D., Ph.D.      Radiation Oncologist   [facility]   Radiation Therapy Center   Phone: [phone]        Clinical Treatment Planning Note    This is a 76-year-old female with diagnosis of Stage IV lung cancer with clinically symptomatic left SI joint metastases.  The palliative radiation therapy is recommended to the patient. The patient is simulated today in the supine position with head rest and under knee cushion to help keep the same position during the daily radiation therapy. 2-3 fields will be used to set up radiation therapy fields to include the left SI joint with margin. I plan to give her radiation therapy at 400 cGy each fraction to a  total of 2000 cGy in 5 treatments.        Aidee Seo M.D., Ph.D.      Radiation Oncologist   [facility]   Radiation Therapy Center   Phone: [phone]

## 2017-01-31 NOTE — PROGRESS NOTES
RADIATION ONCOLOGY CONSULT NOTE      DATE OF VISIT: Jan 31, 2017    Zarina Duncan MD  [facility] ONCOLOGY  [address] [place] [address]      Dear Dr. Duncan:    Thank you very much for referring this patient. As you know Ms. Britt is a 76 year old female with a diagnosis of presumed metastatic lung cancer. She was seen today in consultation with Dr. Seo for consideration of palliative radiotherapy.       HISTORY OF PRESENT ILLNESS:  Ms. Alethea Britt is a 75yo lady with a presumed diagnosis of NSCLC (no biopsy ever done), now with a symptomatic painful metastatic lesion in the posterior left iliac. She was initially found to have a DIOR nodule on PET scan back in 2012. She reused biopsy and any further treatment at that time given her very poor respiratory status. She elected to go on hospice and was on hospice for several months. She then graduated from hospice as she seemed to be doing better without any clinical signs of tumor progression at that time. She has since been followed very conservatively and overall has been doing fairly well.     Most recently, she began complaining of left pelvic pain late last year. An MRI of the lumbar spine on 12/29/2016 demonstrated an incompletely visualized lesion in the posterior left iliac bone that was suspicious for metastatic deposit. Her pain persisted, and so she underwent further workup with a CT pelvis on 01/23/2017. This showed a 3 cm lytic lesion in the posterior left iliac bone, that was felt most likely to be metastatic lung cancer. THe lesion was amenable to biopsy but the patient is adamantly against it.     Given the above findings, she was referred to Dr. Duncan in medical oncology and is now referred to our service for consideration of palliative radiotherapy. On exam, the patient seems to be doing quite well. Her pain is currently rated a 2-3/10 as she recently took Tylenol #3 which she states has helped a lot with the pain. Without medications, the pain  is anywhere from a 4-8/10.     CHEMOTHERAPY HISTORY: NONE    PAST RADIATION THERAPY HISTORY: NONE    PREGNANCY RISK: Postmenopausal    IMPLANTED CARDIAC DEVICE: No    PAST MEDICAL/SURGICAL HISTORY:  Past Medical History   Diagnosis Date     Other dyspnea and respiratory abnormality - Dyspnea. Cardiolite normal 2002. Dobutamine echo normal 2003.      Empyema without mention of fistula - History of Strep pneomoniae pneumonia with pnemothorax and parapneumonic abcess requiring chest tube drainage 1998      Calculus of gallbladder without mention of cholecystitis or obstruction      Bell's palsy - Left 1999      Basal cell carcinoma - right jaw, left arm 9/11.       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)      COPD (chronic obstructive pulmonary disease) (H)      Past Surgical History   Procedure Laterality Date     Surgical history of - Normal  Colonoscopy  12/05     Biopsy - BCC - Right jawline & left medial arm  9/2011     Eye surgery - left eyelid reconstruction  12/2011     ALLERGIES:  Allergies as of 01/31/2017 - reviewed 01/31/2017   Allergen Reaction Noted     Vfend [voriconazole] Other (See Comments) 10/01/2012     Alendronic acid Nausea 01/23/2017     Amoxicillin-pot clavulanate Nausea 01/23/2017     Bactrim [sulfamethoxazole w-trimethoprim] Other (See Comments) and Difficulty breathing 07/09/2012     Boniva [ibandronate sodium] Nausea 01/12/2006     Clarithromycin  01/23/2017     Fosamax Other (See Comments) 05/12/2008     Ibandronic acid Nausea 01/23/2017     Paroxetine  01/23/2017     Raloxifene Nausea 01/23/2017     Raloxifene hcl [raloxifene hydrochloride] Other (See Comments) 05/12/2008     Tetracycline Nausea and Vomiting 01/23/2017     Augmentin Nausea 05/12/2005     Biaxin [clarithromycin] Other (See Comments) 05/12/2005     Paroxetine Other (See Comments) 01/07/2005     Quinolones Other (See Comments) 01/07/2005     Tetracyclines Nausea and Vomiting  01/07/2005     MEDICATIONS:  Current Outpatient Prescriptions   Medication Sig Dispense Refill     valACYclovir (VALTREX) 1000 mg tablet Take 1 tablet (1,000 mg) by mouth 3 times daily for 7 days 21 tablet 0     Diphenhydramine-APAP, sleep, (TYLENOL PM EXTRA STRENGTH PO) Take 2 tablets by mouth every 6 hours For pain       Ibuprofen (ADVIL PO) Take 400 mg by mouth every 6 hours       acetaminophen-codeine (TYLENOL #3) 300-30 MG per tablet Take 1 tablet by mouth every 4 hours as needed for pain maximum 10  tablet(s) per day 90 tablet 0     ipratropium - albuterol 0.5 mg/2.5 mg/3 mL (DUONEB) 0.5-2.5 (3) MG/3ML nebulization Take 1 vial (3 mLs) by nebulization 4 times daily 1 Box 5     budesonide (PULMICORT FLEXHALER) 180 MCG/ACT inhaler Inhale 2 puffs into the lungs 2 times daily 1 Inhaler 11     Ipratropium-Albuterol (COMBIVENT RESPIMAT)  MCG/ACT inhaler Inhale 1 puff into the lungs 4 times daily 2-3 Puffs every 6 hours. 1 Inhaler 11     ranitidine (ZANTAC) 150 MG tablet Take 1 tab 1-2 times per day. 60 tablet 11     predniSONE (DELTASONE) 20 MG tablet Take 2 pills (40 mg) daily x 3 days, then 1.5 pills (30 mg) daily x 3 days, then 1 pill (20 mg) daily x 3 days, then 1/2 pill (10 mg) daily x 3 days. 14 tablet 1     azithromycin (ZITHROMAX) 250 MG tablet Two tablets first day, then one tablet daily for four days.  Can repeat immediately if you desire. 6 tablet 3     oseltamivir (TAMIFLU) 75 MG capsule Take 1 capsule (75 mg) by mouth daily (Patient taking differently: Take 75 mg by mouth daily As needed) 10 capsule 0     Cholecalciferol (VITAMIN D) 1000 UNITS capsule Take 2 capsules by mouth daily        ORDER FOR DME NEBULIZER machine 1 Device 0     ORDER FOR DME Oxygen 2.0 -4.0 liters n/c continuous       COLACE 100 MG OR CAPS 1 CAPSULE ORALLY DAILY AS NEEDED 60 0      FAMILY HISTORY:  Family History   Problem Relation Age of Onset     CANCER - lung cancer Mother      Cancer - colorectal No family hx of       Breast Cancer No family hx of      Other Cancer Mother      SOCIAL HISTORY:  Social History     Marital Status:      Spouse Name: N/A     Number of Children: N/A     Years of Education: N/A     Occupational History     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 Pertinent findings are noted in the HPI and are otherwise unremarkable.     PHYSICAL EXAM:  VITALS: /77 mmHg  Pulse 90  Resp 20  SpO2 94%  Breastfeeding? No  GEN: Appears well, alert, oriented, and in NAD  HEENT: NCAT, EOMI, normal conjunctiva  CV: Warm and well-perfused  RESP: Diminished breathing throughout, but without any wheezing, on 3L NC  SKIN: Normal color and turgor  NEURO: No focal deficits, CN 3-12 grossly intact, tremors in bilateral upper extremities  PSYCH: Appropriate mood and affect    ECOG PERFORMANCE STATUS: 3    IMPRESSION:  In summary, Ms. Britt is a 76 year old female who presents with presumed metastatic lung cancer with a symptomatic left iliac lesion. Lung nodule found back in 2012, and patient refused biopsy and treatment. Went on hospice, but graduated from hospice. Has not had any histologic confirmation of cancer.     RECOMMENDATION:  Given her presumed diagnosis and significant symptoms, we recommend a course of radiotherapy for palliation of her pain. We did discuss the given that we do not have any histologic confirmation of her cancer, that we may be unneccessarily treating her left iliac bone. However, our suspicion is very high for metastatic lung cancer given her clinical history. The  rationale, logistics, risks, benefits, and potential side effects of the proposed treatment were reviewed in detail. The patient had many questions during our conversation today, which were answered to the best of our ability. Informed consent was obtained and she proceeded to CT simulation for RT planning purposes. Plan for 2000 cGy in 5 fractions to her posterior left iliac / SI region.     The patient was seen and discussed with staff, Dr. Seo. Thank you for involving us in the care of this patient.  Please feel free to contact us with questions or concerns at any time.    Luis Washington MD  Resident Radiation Oncology  [facility]  Phone: [phone]        I have personally examined the patient, reviewed and edited the resident's note and agree with the plan of care.    Aidee Seo M.D., Ph.D.      Radiation Oncologist   [facility]   Radiation Therapy Center   Phone: [phone]    CC  Patient Care Team:  Amrailis Almaguer PA-C as PCP - General (Physician Assistant)  Zarina Hernandez MD as MD (Oncology)  Aidee Seo MD as MD (Radiation Oncology)  ZARINA HERNANDEZ

## 2017-01-31 NOTE — PROGRESS NOTES
SIMULATION NOTE:    DIAGNOSIS:  Stage IV lung cancer with clinically symptomatic left SI joint metastases.      INDICATION:  Palliative radiation therapy is recommended for patient for local control and symptomatic relief.      CONSENT:  The possible risks and side effects of radiation therapy have been discussed with the patient in detail and at great length.  Questions were answered to patient's satisfaction.  Written consent was obtained.      SIMULATION:  The patient is in the supine position with a headrest and under knee cushion to help keep same position during daily radiation therapy.  Tentative isocenter is set up in the center of the pelvic region.  We will acquire CT information to help us better locate the target and design the radiation therapy field.      BLOCKS:  Custom blocks will be drawn to minimize radiation to normal tissues and to protect normal organs, including, but not limited to, bowels, kidneys, bladder, spinal cord, bone and soft tissue.      DOSAGE:  I plan to give her radiation therapy at 400 cGy each fraction to a total of 2000 cGy in 5 treatments targeted to the left SI joint region only.        Aidee Seo M.D., Ph.D.      Radiation Oncologist   [facility]   Radiation Therapy Center   Phone: [phone]

## 2017-01-31 NOTE — MR AVS SNAPSHOT
After Visit Summary   1/31/2017    Alethea Britt    MRN: [id number]           Patient Information     Date Of Birth          1940        Visit Information        Provider Department      1/31/2017 3:30 PM Harjinder, [facility] Rad; Aidee Seo MD [facility]        Today's Diagnoses     Bone metastasis (H)    -  1        Follow-ups after your visit        Your next 10 appointments already scheduled     Jan 31, 2017  3:30 PM   SIMULATION with Aidee Seo MD, [facility] Rad Tech   Radiation Therapy Center ([facility])    [address], [address]  [facility] [place] [address]   [phone]              Who to contact     Please call your clinic at [phone] to:    Ask questions about your health    Make or cancel appointments    Discuss your medicines    Learn about your test results    Speak to your doctor   If you have compliments or concerns about an experience at your clinic, or if you wish to file a complaint, please contact [facility] Patient Relations at [phone] or email us at [email or web address]         Additional Information About Your Visit        MyChart Information     Lettuce gives you secure access to your electronic health record. If you see a primary care provider, you can also send messages to your care team and make appointments. If you have questions, please call your primary care clinic.  If you do not have a primary care provider, please call [phone] and they will assist you.      Lettuce is an electronic gateway that provides easy, online access to your medical records. With Lettuce, you can request a clinic appointment, read your test results, renew a prescription or communicate with your care team.     To access your existing account, please contact your [facility] or call [phone] for assistance.        Care EveryWhere ID     This is your Care EveryWhere ID. This could be used by  other organizations to access your [place] medical records  [id number]         Blood Pressure from Last 3 Encounters:   01/31/17 149/77   01/30/17 180/68   01/23/17 130/81    Weight from Last 3 Encounters:   01/30/17 54.613 kg (120 lb 6.4 oz)   01/23/17 54.432 kg (120 lb)   11/18/16 54.885 kg (121 lb)              Today, you had the following     No orders found for display         Today's Medication Changes          These changes are accurate as of: 1/31/17  3:26 PM.  If you have any questions, ask your nurse or doctor.               These medicines have changed or have updated prescriptions.        Dose/Directions    oseltamivir 75 MG capsule   Commonly known as:  TAMIFLU   This may have changed:  additional instructions   Used for:  Chronic obstructive pulmonary disease, unspecified COPD type (H)        Dose:  75 mg   Take 1 capsule (75 mg) by mouth daily   Quantity:  10 capsule   Refills:  0                Primary Care Provider Office Phone # Fax #    Amarilis Almaguer PA-C [phone]       [facility] [address] [place] [address]        Thank you!     Thank you for choosing [facility]  for your care. Our goal is always to provide you with excellent care. Hearing back from our patients is one way we can continue to improve our services. Please take a few minutes to complete the written survey that you may receive in the mail after your visit with us. Thank you!             Your Updated Medication List - Protect others around you: Learn how to safely use, store and throw away your medicines at www.disposemymeds.org.          This list is accurate as of: 1/31/17  3:26 PM.  Always use your most recent med list.                   Brand Name Dispense Instructions for use    acetaminophen-codeine 300-30 MG per tablet    TYLENOL #3    90 tablet    Take 1 tablet by mouth every 4 hours as needed for pain maximum 10  tablet(s) per day       ADVIL PO      Take 400 mg  by mouth every 6 hours       azithromycin 250 MG tablet    ZITHROMAX    6 tablet    Two tablets first day, then one tablet daily for four days.  Can repeat immediately if you desire.       budesonide 180 MCG/ACT inhaler    PULMICORT FLEXHALER    1 Inhaler    Inhale 2 puffs into the lungs 2 times daily       COLACE 100 MG capsule   Generic drug:  docusate sodium     60    1 CAPSULE ORALLY DAILY AS NEEDED       * ipratropium - albuterol 0.5 mg/2.5 mg/3 mL 0.5-2.5 (3) MG/3ML neb solution    DUONEB    1 Box    Take 1 vial (3 mLs) by nebulization 4 times daily       * Ipratropium-Albuterol  MCG/ACT inhaler    COMBIVENT RESPIMAT    1 Inhaler    Inhale 1 puff into the lungs 4 times daily 2-3 Puffs every 6 hours.       * order for DME      Oxygen 2.0 -4.0 liters n/c continuous       * order for DME     1 Device    NEBULIZER machine       oseltamivir 75 MG capsule    TAMIFLU    10 capsule    Take 1 capsule (75 mg) by mouth daily       predniSONE 20 MG tablet    DELTASONE    14 tablet    Take 2 pills (40 mg) daily x 3 days, then 1.5 pills (30 mg) daily x 3 days, then 1 pill (20 mg) daily x 3 days, then 1/2 pill (10 mg) daily x 3 days.       ranitidine 150 MG tablet    ZANTAC    60 tablet    Take 1 tab 1-2 times per day.       TYLENOL PM EXTRA STRENGTH PO      Take 2 tablets by mouth every 6 hours For pain       valACYclovir 1000 mg tablet    VALTREX    21 tablet    Take 1 tablet (1,000 mg) by mouth 3 times daily for 7 days       vitamin D 1000 UNITS capsule      Take 2 capsules by mouth daily       * Notice:  This list has 4 medication(s) that are the same as other medications prescribed for you. Read the directions carefully, and ask your doctor or other care provider to review them with you.

## 2017-01-31 NOTE — NURSING NOTE
REASON FOR APPOINTMENT   Presumed metastatic lung cancer with hip mets, + PET in 2012, no bx done for fear of pneumo, did not want aggressive tx, went to hospice/came off hospice  Now having pain in left hip. Here to discuss possibility of RT.    PERSONAL HISTORY OF CANCER   Previous Cancer ? no   Prior Radiation ? no   Prior Chemotherapy ? no  Prior Hormonal Therapy ? no     RECENT IMAGING STUDIES  See epic    REFERRALS NEEDED  Not at this time    VITALS  /77 mmHg  Pulse 90  Resp 20  SpO2 94%  Breastfeeding? No    PACEMAKER/IMPLANTED CARDIAC DEVICE : No    PAIN  Left hip pain, rates pain 2-3/10 with tylenol #3  Pain was 4-8/10    PSYCHOSOCIAL  Marital Status:   Patient lives in [place], [place] with her  Kelvin.  Number of children: 4 (here with 3 daughters)  Working status: retired  Do you feel safe in your home? Yes    REVIEW OF SYSTEMS  Skin: pale, thin  Eyes: glasses  Ears/Nose/Throat: negative  Respiratory: sob, wears oxygen 3.5-5 liters baseline, some cough  Cardiovascular: negative  Gastrointestinal: constipation  Genitourinary: occasional dysuria, nocturia  Musculoskeletal: arthritis, joint pain and muscular weakness  Neurologic: long hx of tremors, states medication related  Psychiatric: feeling anxious  Hematologic/Lymphatic/Immunologic: negative  Endocrine: night sweats    WOMEN ONLY  Any chance you may be pregnant: No    Radiation Oncology Patient Teaching    Current Concern: quality of life very important, may not decide to pursue RT,   Previously declined chemo    Person involved with teaching: Patient, , daughters x 3  Patient asked Questions: Yes  Patient was cooperative: Yes  Patient was receptive (willing to accept information given): Yes    Education Assessment  Comprehension ability: High  Knowledge level: Medium  Factors affecting teaching: None    Education Materials Given  Radiation Therapy and You  Caring for Your Skin During Radiation ...  Eating Hints  Diet and  Nutrition Information    Educational Topics Discussed  Side effects, Medications, Pain management, Activity, Nutrition, Adjustment to illness and Community resources    Response To Teaching  Verbalizes understanding    GYN Only  Vaginal Dilator-given and educated: N/A    Do you have an advanced directive or living will? yes  Are you DNR/DNI? yes

## 2017-02-08 NOTE — Clinical Note
Date:February 9, 2017      Provider requested that no letter be sent. Do not send.       [facility] Health Information

## 2017-02-08 NOTE — PROGRESS NOTES
[facility]  SPECIALIZING IN BREAKTHROUGHS  Radiation Oncology    On Treatment Visit Note      Alethea Britt      Date: 2017   MRN: [id number]   : 1940  Diagnosis: Probable lung cancer, no bx      Reason for Visit:  On Radiation Treatment Visit     Treatment Summary to Date  Treatment Site: Left hip Current Dose: 1200/2000 cGy Fractions: 3/5      Chemotherapy  Chemo concurrent with radx?: No    Subjective:       Nursing ROS:   Nutrition Alteration  Diet Type: Patient's Preference  Skin  Skin Reaction: 0 - No changes  Cardiovascular  Respiratory effort: 5 - Marked dsypnea  Gastrointestinal  Nausea: 0 - None  Genitourinary  Urinary Status: 0 - Normal  Pain Assessment  Pain Note: hip pain improving    Objective:   /75 mmHg  Pulse 78      Assessment:    Tolerating radiation therapy well.  All questions and concerns addressed.    Plan:   1. Continue current therapy.        Mosaiq chart and setup information reviewed  MVCT/IGRT images checked  Medication Review  Med list reviewed with patient?: Yes  Med Note: tylenol #3 and advil           Aidee Seo MD

## 2017-02-08 NOTE — Clinical Note
2017      RE: Alethea Britt  [address] [facility] [address]       [facility]  SPECIALIZING IN BREAKTHROUGHS  Radiation Oncology    On Treatment Visit Note      Alethea Britt      Date: 2017   MRN: [id number]   : 1940  Diagnosis: Probable lung cancer, no bx      Reason for Visit:  On Radiation Treatment Visit     Treatment Summary to Date  Treatment Site: Left hip Current Dose: 1200/2000 cGy Fractions: 3/5      Chemotherapy  Chemo concurrent with radx?: No    Subjective:       Nursing ROS:   Nutrition Alteration  Diet Type: Patient's Preference  Skin  Skin Reaction: 0 - No changes  Cardiovascular  Respiratory effort: 5 - Marked dsypnea  Gastrointestinal  Nausea: 0 - None  Genitourinary  Urinary Status: 0 - Normal  Pain Assessment  Pain Note: hip pain improving    Objective:   /75 mmHg  Pulse 78      Assessment:    Tolerating radiation therapy well.  All questions and concerns addressed.    Plan:   1. Continue current therapy.        Mosaiq chart and setup information reviewed  MVCT/IGRT images checked  Medication Review  Med list reviewed with patient?: Yes  Med Note: tylenol #3 and advil           MD Aidee Boyle MD

## 2017-02-08 NOTE — MR AVS SNAPSHOT
After Visit Summary   2/8/2017    Alethea Britt    MRN: [id number]           Patient Information     Date Of Birth          1940        Visit Information        Provider Department      2/8/2017 2:30 PM Aidee Seo MD Rad[facility] Therapy [facility]        Today's Diagnoses     Bone metastasis (H)    -  1        Follow-ups after your visit        Your next 10 appointments already scheduled     Feb 09, 2017 11:45 AM   Linear Accelerator with Lr [facility]   Radiation Therapy [place] ([facility])    [address]  [facility] [place] [address]   [phone]            Feb 10, 2017  9:45 AM   Linear Accelerator with Lr [facility]   Radiation Therapy [place] ([facility])    [address]  [facility] [address]   [phone]              Who to contact     Please call your clinic at [phone] to:    Ask questions about your health    Make or cancel appointments    Discuss your medicines    Learn about your test results    Speak to your doctor   If you have compliments or concerns about an experience at your clinic, or if you wish to file a complaint, please contact [facility] Patient Relations at [phone] or email us at [email or web address]         Additional Information About Your Visit        MyChart Information     Wheebox gives you secure access to your electronic health record. If you see a primary care provider, you can also send messages to your care team and make appointments. If you have questions, please call your primary care clinic.  If you do not have a primary care provider, please call [phone] and they will assist you.      Wheebox is an electronic gateway that provides easy, online access to your medical records. With Wheebox, you can request a clinic appointment, read your test results, renew a prescription or communicate with your care team.     To access your existing account, please  contact your [facility] or call [phone] for assistance.        Care EveryWhere ID     This is your Care EveryWhere ID. This could be used by other organizations to access your [place] medical records  [id number]        Your Vitals Were     Pulse                   78            Blood Pressure from Last 3 Encounters:   02/08/17 155/75   01/31/17 149/77   01/30/17 180/68    Weight from Last 3 Encounters:   01/30/17 54.613 kg (120 lb 6.4 oz)   01/23/17 54.432 kg (120 lb)   11/18/16 54.885 kg (121 lb)              Today, you had the following     No orders found for display         Today's Medication Changes          These changes are accurate as of: 2/8/17  3:24 PM.  If you have any questions, ask your nurse or doctor.               These medicines have changed or have updated prescriptions.        Dose/Directions    oseltamivir 75 MG capsule   Commonly known as:  TAMIFLU   This may have changed:  additional instructions   Used for:  Chronic obstructive pulmonary disease, unspecified COPD type (H)        Dose:  75 mg   Take 1 capsule (75 mg) by mouth daily   Quantity:  10 capsule   Refills:  0                Primary Care Provider Office Phone # Fax #    Amarilis Almaguer PA-C [phone]       [place] [id number] [place] [address]        Thank you!     Thank you for choosing [facility]  for your care. Our goal is always to provide you with excellent care. Hearing back from our patients is one way we can continue to improve our services. Please take a few minutes to complete the written survey that you may receive in the mail after your visit with us. Thank you!             Your Updated Medication List - Protect others around you: Learn how to safely use, store and throw away your medicines at www.disposemymeds.org.          This list is accurate as of: 2/8/17  3:24 PM.  Always use your most recent med list.                    Brand Name Dispense Instructions for use    acetaminophen-codeine 300-30 MG per tablet    TYLENOL #3    90 tablet    Take 1 tablet by mouth every 4 hours as needed for pain maximum 10  tablet(s) per day       ADVIL PO      Take 400 mg by mouth every 6 hours       azithromycin 250 MG tablet    ZITHROMAX    6 tablet    Two tablets first day, then one tablet daily for four days.  Can repeat immediately if you desire.       budesonide 180 MCG/ACT inhaler    PULMICORT FLEXHALER    1 Inhaler    Inhale 2 puffs into the lungs 2 times daily       COLACE 100 MG capsule   Generic drug:  docusate sodium     60    1 CAPSULE ORALLY DAILY AS NEEDED       * ipratropium - albuterol 0.5 mg/2.5 mg/3 mL 0.5-2.5 (3) MG/3ML neb solution    DUONEB    1 Box    Take 1 vial (3 mLs) by nebulization 4 times daily       * Ipratropium-Albuterol  MCG/ACT inhaler    COMBIVENT RESPIMAT    1 Inhaler    Inhale 1 puff into the lungs 4 times daily 2-3 Puffs every 6 hours.       * order for DME      Oxygen 2.0 -4.0 liters n/c continuous       * order for DME     1 Device    NEBULIZER machine       oseltamivir 75 MG capsule    TAMIFLU    10 capsule    Take 1 capsule (75 mg) by mouth daily       predniSONE 20 MG tablet    DELTASONE    14 tablet    Take 2 pills (40 mg) daily x 3 days, then 1.5 pills (30 mg) daily x 3 days, then 1 pill (20 mg) daily x 3 days, then 1/2 pill (10 mg) daily x 3 days.       ranitidine 150 MG tablet    ZANTAC    60 tablet    Take 1 tab 1-2 times per day.       TYLENOL PM EXTRA STRENGTH PO      Take 2 tablets by mouth every 6 hours For pain       valACYclovir 1000 mg tablet    VALTREX    21 tablet    Take 1 tablet (1,000 mg) by mouth 3 times daily for 7 days       vitamin D 1000 UNITS capsule      Take 2 capsules by mouth daily       * Notice:  This list has 4 medication(s) that are the same as other medications prescribed for you. Read the directions carefully, and ask your doctor or other care provider to review them with  you.

## 2017-02-10 NOTE — MR AVS SNAPSHOT
After Visit Summary   2/10/2017    Alethea Britt    MRN: [id number]           Patient Information     Date Of Birth          1940        Visit Information        Provider Department      2/10/2017 9:45 AM Tech, Lr [facility] Radiation Therapy [facility]        Today's Diagnoses     Bone metastasis (H)    -  1       Follow-ups after your visit        Your next 10 appointments already scheduled     Mar 10, 2017 11:15 AM CST   Return Visit with Aidee Seo MD   Radiation Therapy Center ([facility])    [address], [address]  [facility] [place] [address]   [phone]              Who to contact     Please call your clinic at [phone] to:    Ask questions about your health    Make or cancel appointments    Discuss your medicines    Learn about your test results    Speak to your doctor   If you have compliments or concerns about an experience at your clinic, or if you wish to file a complaint, please contact [facility] Patient Relations at [phone] or email us at [email or web address]         Additional Information About Your Visit        MyChart Information     RocketPlayt gives you secure access to your electronic health record. If you see a primary care provider, you can also send messages to your care team and make appointments. If you have questions, please call your primary care clinic.  If you do not have a primary care provider, please call [phone] and they will assist you.      [employer] is an electronic gateway that provides easy, online access to your medical records. With [employer] you can request a clinic appointment, read your test results, renew a prescription or communicate with your care team.     To access your existing account, please contact your [facility] or call [phone] for assistance.        Care EveryWhere ID     This is your Care EveryWhere ID. This could be used by other organizations to  access your [place] medical records  [id number]         Blood Pressure from Last 3 Encounters:   02/08/17 155/75   01/31/17 149/77   01/30/17 180/68    Weight from Last 3 Encounters:   01/30/17 54.6 kg (120 lb 6.4 oz)   01/23/17 54.4 kg (120 lb)   11/18/16 54.9 kg (121 lb)              Today, you had the following     No orders found for display         Today's Medication Changes          These changes are accurate as of: 2/10/17 11:59 PM.  If you have any questions, ask your nurse or doctor.               These medicines have changed or have updated prescriptions.        Dose/Directions    oseltamivir 75 MG capsule   Commonly known as:  TAMIFLU   This may have changed:  additional instructions   Used for:  Chronic obstructive pulmonary disease, unspecified COPD type (H)        Dose:  75 mg   Take 1 capsule (75 mg) by mouth daily   Quantity:  10 capsule   Refills:  0            Where to get your medicines      Some of these will need a paper prescription and others can be bought over the counter.  Ask your nurse if you have questions.     Bring a paper prescription for each of these medications     acetaminophen-codeine 300-30 MG per tablet                Primary Care Provider Office Phone # Fax #    Amarilis Almaguer PA-C [phone]       [address] [address]        Thank you!     Thank you for choosing [facility]  for your care. Our goal is always to provide you with excellent care. Hearing back from our patients is one way we can continue to improve our services. Please take a few minutes to complete the written survey that you may receive in the mail after your visit with us. Thank you!             Your Updated Medication List - Protect others around you: Learn how to safely use, store and throw away your medicines at www.disposemymeds.org.          This list is accurate as of: 2/10/17 11:59 PM.  Always use your most recent med list.                    Brand Name Dispense Instructions for use    acetaminophen-codeine 300-30 MG per tablet    TYLENOL #3    90 tablet    Take 1 tablet by mouth every 4 hours as needed for pain maximum 10  tablet(s) per day       ADVIL PO      Take 400 mg by mouth every 6 hours       azithromycin 250 MG tablet    ZITHROMAX    6 tablet    Two tablets first day, then one tablet daily for four days.  Can repeat immediately if you desire.       budesonide 180 MCG/ACT inhaler    PULMICORT FLEXHALER    1 Inhaler    Inhale 2 puffs into the lungs 2 times daily       COLACE 100 MG capsule   Generic drug:  docusate sodium     60    1 CAPSULE ORALLY DAILY AS NEEDED       * ipratropium - albuterol 0.5 mg/2.5 mg/3 mL 0.5-2.5 (3) MG/3ML neb solution    DUONEB    1 Box    Take 1 vial (3 mLs) by nebulization 4 times daily       * Ipratropium-Albuterol  MCG/ACT inhaler    COMBIVENT RESPIMAT    1 Inhaler    Inhale 1 puff into the lungs 4 times daily 2-3 Puffs every 6 hours.       * order for DME      Oxygen 2.0 -4.0 liters n/c continuous       * order for DME     1 Device    NEBULIZER machine       oseltamivir 75 MG capsule    TAMIFLU    10 capsule    Take 1 capsule (75 mg) by mouth daily       predniSONE 20 MG tablet    DELTASONE    14 tablet    Take 2 pills (40 mg) daily x 3 days, then 1.5 pills (30 mg) daily x 3 days, then 1 pill (20 mg) daily x 3 days, then 1/2 pill (10 mg) daily x 3 days.       ranitidine 150 MG tablet    ZANTAC    60 tablet    Take 1 tab 1-2 times per day.       TYLENOL PM EXTRA STRENGTH PO      Take 2 tablets by mouth every 6 hours For pain       valACYclovir 1000 mg tablet    VALTREX    21 tablet    Take 1 tablet (1,000 mg) by mouth 3 times daily for 7 days       vitamin D 1000 UNITS capsule      Take 2 capsules by mouth daily       * Notice:  This list has 4 medication(s) that are the same as other medications prescribed for you. Read the directions carefully, and ask your doctor or other care provider to  review them with you.

## 2017-02-13 NOTE — TELEPHONE ENCOUNTER
"Call received from Alethea with questions about SE s/p radiation to hip. She reported fatigue and napping twice daily, poor appetite, diarrhea. Rn reviewed these would be expected after her radiation to the hip area and would probably last 5-7 days after her last treatment then week to week slowly improve. Encouraged to drink water and gatorade and to push herself to eat enough calories. She also reported she was having difficulty swallowing which started last week. RN told her this was not related to radiation as her hip was treated and swallowing changes were not expected - asked if she had cough/cold/fever symptoms which she said she did not. RN reviewed ways to manage radiation SE and recommended Alethea check in with PCP if swallowing changes persist.  She also noted the pain in the hip is constant but mild and tolerable. She has been taking extra strength tylenol 2 tabs Q 6 hrs - reviewed max dose per day. She had been taking ibuprofen as well but felt that was \"making her dehydrated\", RN offered she could maybe take it once daily to help with pain. She had stopped taking tylenol #3, even though it had helped the pain, as she is worried about the effect of breathing suppression and her poor lung function - RN listened and then reviewed that potentially the pain level will decrease in about 4 weeks once the radiation has had more time to work.   Alethea will call back later in the week if her SE are not improving. She would like to follow up in 1 month with Dr. Seo to check on pain control and improvement in side effects.  "

## 2017-02-14 NOTE — TELEPHONE ENCOUNTER
Alethea called again to report fatigue, loose stool/diahrrea, loss of appetite, dizziness with standing. Alethea radiation treatment reviewed with MD and physics. Her dose was 2000 cGy to a small bony area. They would not expect a lot of bowel SE. Alethea denied fever. Using gatorade and water to rehydrate. MD recommended trial of imodium and if no improvement got to urgent care or PCP.   Alethea noted she will consider urgent care/ PCP if the above doesn't calm her symptoms.

## 2017-02-14 NOTE — PROGRESS NOTES
February 10, 2017             Funmilayo Hernandez MD   [facility]    [address] [place] [address]      RE: Alethea Britt   MRN: [id number]   : 1940      Dear Dr. Hernandez:      Your patient Mrs. Alethea Britt completed her palliative radiation therapy on 02/10/2017.  As you know, Mrs. Britt is a 76-year-old female with a diagnosis of stage IV lung cancer with clinically symptomatic left SI joint metastasis.  She received palliative radiation therapy in our clinic with a total dose of 2000 cGy in 5 treatments at 400 cGy each fraction from 2017-02/10/2017.  She tolerated radiation therapy very well and denies any significant acute side effects at the end of the therapy.  She will continue her long-term followup and ongoing care for her lung cancer with your office due to convenience.  The patient therefore will be followed in our clinic only on a p.r.n. basis.      Again, thank you very much for the referral of Mrs. Britt and allowing me to participate in the care of this fine lady.  If you have any questions about this patient, please do not hesitate to call.      Sincerely,      Aidee Seo MD        CC  Patient Care Team:  Amarilis Almageur PA-C as PCP - General (Physician Assistant)  Zarina Hernandez MD as MD (Oncology)  Aidee Seo MD as MD (Radiation Oncology)  ZARINA HERNANDEZ

## 2017-03-09 NOTE — NURSING NOTE
FOLLOW-UP VISIT    Patient Name: Alethea Britt      : 1940     Age: 76 year old        ______________________________________________________________________________     Chief Complaint   Patient presents with     Radiation Therapy     follow up     /77  Pulse 96  Resp 18  SpO2 98%     Date Radiation Completed: 2/10/2017  2000 cGy in 5 fxns    Pain  Current history of pain associated with this visit:   Intensity: 3  Current: dull  Location: L hip/SI joint  Treatment: tylenol 1000 mg Q 6 hrs, occasionally takes IBU 200mg along with tylenol if hip pain is more noticeable    Meds  Current Med List Reviewed: Not asked  Medication Note:     Labs  Other Labs: No    Imaging  None    Skin: Warm  Dry  Intact  Energy Level: low    Other Appointments:     Date  MD Name:  No upcoming appointments with PCP or med onc      Other Notes: Plan was for patient to return to follow up with Dr. Duncan and PCP. She requested a follow up here.  Pt reports diarrhea, poor appetite, fatigue, exteremly sore throat right after radiation was complete - see previous phone notes - would not have expected these from radiation. All these things are slowly improving.

## 2017-03-09 NOTE — LETTER
3/9/2017      RE: Alethea Britt  [address] [facility] [address]       RADIATION ONCOLOGY FOLLOW UP  March 9, 2017    DISEASE TREATED:  Stage IV lung cancer with clinically symptomatic left SI joint metastases.       TYPE OF RADIATION THERAPY ADMINISTERED: 2000 cGy in 5 treatments at 400 cGy each fraction from 02/06/2017-02/10/2017.      INTERVAL SINCE COMPLETION OF RADIATION THERAPY:  Approximately 1 months.      SUBJECTIVE: Mrs. Britt is a 76-year-old female with a diagnosis of stage IV lung cancer with clinically symptomatic left SI joint metastasis. She received palliative radiation therapy in our clinic with a total dose of 2000 cGy in 5 treatments at 400 cGy each fraction from 02/06/2017-02/10/2017. She tolerated radiation therapy very well and denies any significant acute side effects at the end of the therapy.     The patient has been recovering well since completion of the palliative radiation therapy.  She did have a few days' episodes of loose bowel, which eventually resolved.  She denies any significant pain involving the pelvic region.  She is currently taking no pain medication for her bony metastases.  The patient otherwise is still having generalized fatigue and shortness of breath secondary to her lung disease.  She is here for routine post radiation therapy office followup.      PHYSICAL EXAMINATION:   GENERAL:  She is a well-developed female in no acute distress.  She is awake, alert and well oriented.   VITAL SIGNS:  Blood pressure is 156/77, pulse is 96, and O2 saturation measures 98% with 2-3 L/min nasal oxygen supply.  The rest of the examination is unremarkable.   LUNGS:  Clear.  Breathing equal on both sides.   CARDIAC:  Regular heart rate with no murmur.     MUSCULOSKELETAL:  There is no tenderness along the back and pelvic region.   ABDOMEN:  Soft with no tenderness.      ASSESSMENT/PLAN:  Mrs. Britt has been stable since completion of palliative radiation therapy.  She  had complete pain relief at the time of evaluation.  She will continue her long-term followup and ongoing care for her lung cancer with Dr. Duncan, Medical Oncology, due to convenience.  She therefore will be followed in our clinic only on a p.r.n. basis.      I spent approximately 20 minutes today with the patient, and 80% of the time was used for counseling.            Aidee Seo M.D., Ph.D.      Radiation Oncologist   [facility]   Rad[facility] Center   Phone: [phone]    CC  Patient Care Team:  Amarilis Almaguer PA-C as PCP - General (Physician Assistant)  Zarina Duncan MD as MD (Oncology)

## 2017-03-09 NOTE — MR AVS SNAPSHOT
After Visit Summary   3/9/2017    Alethea Britt    MRN: [id number]           Patient Information     Date Of Birth          1940        Visit Information        Provider Department      3/9/2017 2:30 PM Aidee Seo MD [facility]         Follow-ups after your visit        Who to contact     Please call your clinic at [phone] to:    Ask questions about your health    Make or cancel appointments    Discuss your medicines    Learn about your test results    Speak to your doctor   If you have compliments or concerns about an experience at your clinic, or if you wish to file a complaint, please contact [facility] Patient Relations at [phone] or email us at [email or web address]         Additional Information About Your Visit        MyChart Information     [employer] gives you secure access to your electronic health record. If you see a primary care provider, you can also send messages to your care team and make appointments. If you have questions, please call your primary care clinic.  If you do not have a primary care provider, please call [phone] and they will assist you.      [employer] is an electronic gateway that provides easy, online access to your medical records. With [employer], you can request a clinic appointment, read your test results, renew a prescription or communicate with your care team.     To access your existing account, please contact your [facility] or call [phone] for assistance.        Care EveryWhere ID     This is your Care EveryWhere ID. This could be used by other organizations to access your [facility] medical records  [id number]        Your Vitals Were     Pulse Respirations Pulse Oximetry             96 18 98%          Blood Pressure from Last 3 Encounters:   03/09/17 156/77   02/08/17 155/75   01/31/17 149/77    Weight from Last 3 Encounters:   01/30/17 54.6 kg (120 lb 6.4 oz)    01/23/17 54.4 kg (120 lb)   11/18/16 54.9 kg (121 lb)              Today, you had the following     No orders found for display         Today's Medication Changes          These changes are accurate as of: 3/9/17  3:42 PM.  If you have any questions, ask your nurse or doctor.               These medicines have changed or have updated prescriptions.        Dose/Directions    oseltamivir 75 MG capsule   Commonly known as:  TAMIFLU   This may have changed:  additional instructions   Used for:  Chronic obstructive pulmonary disease, unspecified COPD type (H)        Dose:  75 mg   Take 1 capsule (75 mg) by mouth daily   Quantity:  10 capsule   Refills:  0                Primary Care Provider Office Phone # Fax #    Amarilis Almaguer PA-C [phone]       [facility] [address] [address]        Thank you!     Thank you for choosing [facility]  for your care. Our goal is always to provide you with excellent care. Hearing back from our patients is one way we can continue to improve our services. Please take a few minutes to complete the written survey that you may receive in the mail after your visit with us. Thank you!             Your Updated Medication List - Protect others around you: Learn how to safely use, store and throw away your medicines at www.disposemymeds.org.          This list is accurate as of: 3/9/17  3:42 PM.  Always use your most recent med list.                   Brand Name Dispense Instructions for use    acetaminophen-codeine 300-30 MG per tablet    TYLENOL #3    90 tablet    Take 1 tablet by mouth every 4 hours as needed for pain maximum 10  tablet(s) per day       ADVIL PO      Take 400 mg by mouth every 6 hours       azithromycin 250 MG tablet    ZITHROMAX    6 tablet    Two tablets first day, then one tablet daily for four days.  Can repeat immediately if you desire.       budesonide 180 MCG/ACT inhaler    PULMICORT FLEXHALER    1  Inhaler    Inhale 2 puffs into the lungs 2 times daily       COLACE 100 MG capsule   Generic drug:  docusate sodium     60    1 CAPSULE ORALLY DAILY AS NEEDED       * ipratropium - albuterol 0.5 mg/2.5 mg/3 mL 0.5-2.5 (3) MG/3ML neb solution    DUONEB    1 Box    Take 1 vial (3 mLs) by nebulization 4 times daily       * Ipratropium-Albuterol  MCG/ACT inhaler    COMBIVENT RESPIMAT    1 Inhaler    Inhale 1 puff into the lungs 4 times daily 2-3 Puffs every 6 hours.       * order for DME      Oxygen 2.0 -4.0 liters n/c continuous       * order for DME     1 Device    NEBULIZER machine       oseltamivir 75 MG capsule    TAMIFLU    10 capsule    Take 1 capsule (75 mg) by mouth daily       predniSONE 20 MG tablet    DELTASONE    14 tablet    Take 2 pills (40 mg) daily x 3 days, then 1.5 pills (30 mg) daily x 3 days, then 1 pill (20 mg) daily x 3 days, then 1/2 pill (10 mg) daily x 3 days.       ranitidine 150 MG tablet    ZANTAC    60 tablet    Take 1 tab 1-2 times per day.       TYLENOL PM EXTRA STRENGTH PO      Take 2 tablets by mouth every 6 hours For pain       valACYclovir 1000 mg tablet    VALTREX    21 tablet    Take 1 tablet (1,000 mg) by mouth 3 times daily for 7 days       vitamin D 1000 UNITS capsule      Take 2 capsules by mouth daily       * Notice:  This list has 4 medication(s) that are the same as other medications prescribed for you. Read the directions carefully, and ask your doctor or other care provider to review them with you.

## 2017-03-09 NOTE — PROGRESS NOTES
RADIATION ONCOLOGY FOLLOW UP  March 9, 2017    DISEASE TREATED:  Stage IV lung cancer with clinically symptomatic left SI joint metastases.       TYPE OF RADIATION THERAPY ADMINISTERED: 2000 cGy in 5 treatments at 400 cGy each fraction from 02/06/2017-02/10/2017.      INTERVAL SINCE COMPLETION OF RADIATION THERAPY:  Approximately 1 months.      SUBJECTIVE: Mrs. Britt is a 76-year-old female with a diagnosis of stage IV lung cancer with clinically symptomatic left SI joint metastasis. She received palliative radiation therapy in our clinic with a total dose of 2000 cGy in 5 treatments at 400 cGy each fraction from 02/06/2017-02/10/2017. She tolerated radiation therapy very well and denies any significant acute side effects at the end of the therapy.     The patient has been recovering well since completion of the palliative radiation therapy.  She did have a few days' episodes of loose bowel, which eventually resolved.  She denies any significant pain involving the pelvic region.  She is currently taking no pain medication for her bony metastases.  The patient otherwise is still having generalized fatigue and shortness of breath secondary to her lung disease.  She is here for routine post radiation therapy office followup.      PHYSICAL EXAMINATION:   GENERAL:  She is a well-developed female in no acute distress.  She is awake, alert and well oriented.   VITAL SIGNS:  Blood pressure is 156/77, pulse is 96, and O2 saturation measures 98% with 2-3 L/min nasal oxygen supply.  The rest of the examination is unremarkable.   LUNGS:  Clear.  Breathing equal on both sides.   CARDIAC:  Regular heart rate with no murmur.     MUSCULOSKELETAL:  There is no tenderness along the back and pelvic region.   ABDOMEN:  Soft with no tenderness.      ASSESSMENT/PLAN:  Mrs. Britt has been stable since completion of palliative radiation therapy.  She had complete pain relief at the time of evaluation.  She will continue her long-term  followup and ongoing care for her lung cancer with Dr. Hernandez, Medical Oncology, due to convenience.  She therefore will be followed in our clinic only on a p.r.n. basis.      I spent approximately 20 minutes today with the patient, and 80% of the time was used for counseling.            Aidee Seo M.D., Ph.D.      Radiation Oncologist   [facility]   Radiation Therapy Center   Phone: [phone]    CC  Patient Care Team:  Amarilis Almaguer PA-C as PCP - General (Physician Assistant)  Ynes Hernandez MD as MD (Oncology)  Aidee Seo MD as MD (Radiation Oncology)  YNES HERNANDEZ

## 2017-05-09 NOTE — TELEPHONE ENCOUNTER
Ipratropium-Albuterol (COMBIVENT RESPIMAT)  MCG/ACT inhaler       Last Written Prescription Date: 11/18/2016  Last Fill Quantity: 1, # refills: 11    Last Office Visit with G, P or [facility] prescribing provider:  1/30/2017   Future Office Visit:       Date of Last Asthma Action Plan Letter:   There are no preventive care reminders to display for this patient.   Asthma Control Test: No flowsheet data found.    Date of Last Spirometry Test:   No results found for this or any previous visit.

## 2017-05-28 NOTE — DISCHARGE INSTRUCTIONS
OK to observe things at this time.    Have a re check if this area becomes larger, painful, more red or irritated.

## 2017-05-28 NOTE — ED AVS SNAPSHOT
[facility]    [address] [address]    Phone:  [phone]    Fax:  [phone]                                       Alethea Britt   MRN: [id number]    Department:  [facility]   Date of Visit:  5/28/2017           After Visit Summary Signature Page     I have received my discharge instructions, and my questions have been answered. I have discussed any challenges I see with this plan with the nurse or doctor.    ..........................................................................................................................................  Patient/Patient Representative Signature      ..........................................................................................................................................  Patient Representative Print Name and Relationship to Patient    ..................................................               ................................................  Date                                            Time    ..........................................................................................................................................  Reviewed by Signature/Title    ...................................................              ..............................................  Date                                                            Time

## 2017-05-28 NOTE — ED PROVIDER NOTES
History     Chief Complaint   Patient presents with     Leg Pain     HPI  Alethea Britt is a 76 year old female with a history of lung cancer with bone metastasis, including in the left iliac crest, not currently receiving treatment, who presents to the ED for a new area of purple-vincent bruising and tenderness in her right lower shin, which she just noticed this morning. The patient does not recall injuring or bumping her leg against anything. The patient was diagnosed with presumed lung cancer in 10/2012 but elected not to pursue a biopsy or further treatment. In 11/2016 she was seen in clinic for 3 weeks of hip pain at which time MRI found a lesion in the posterior left iliac bone, concerning for metastasis. The patient received 5 days of radiation on the hip in February 2017, but did not wish to proceed with any further treatment. She denies any change in her breathing or oxygen requirements.    Patient Active Problem List   Diagnosis     Osteoporosis     Chronic airway obstruction (H)     Esophageal reflux     Lumbago     Disturbance of skin sensation     GUILLIAN BARRE     Hyperlipidemia LDL goal <160     z-Advanced directives, counseling/discussion     Health Care Home     Lung cancer     Weakness and numbness     CIDP (chronic inflammatory demyelinating polyneuropathy) (H)     Vitamin B12 deficiency disease     Health care maintenance -- DO NOT CALL     Anisocoria     COPD (chronic obstructive pulmonary disease) (H)     Bone metastasis (H)     Current Outpatient Prescriptions   Medication Sig Dispense Refill     polyethylene glycol (MIRALAX/GLYCOLAX) powder Take 1 capful by mouth daily as needed for constipation       acetaminophen (TYLENOL) 500 MG tablet Take 1,000 mg by mouth every 8 hours as needed for mild pain Rapid release       COMBIVENT RESPIMAT  MCG/ACT inhaler INHALE TWO TO THREE PUFFS INTO THE LUNGS EVERY 6 HOURS (Patient taking differently: INHALE TWO TO THREE PUFFS INTO THE LUNGS EVERY 2  HOURS) 4 g 11     budesonide (PULMICORT FLEXHALER) 180 MCG/ACT inhaler Inhale 2 puffs into the lungs 2 times daily 1 Inhaler 11     ranitidine (ZANTAC) 150 MG tablet Take 1 tab 1-2 times per day. (Patient taking differently: Take 150 mg by mouth 2 times daily as needed Take 1 tab 1-2 times per day.) 60 tablet 11     azithromycin (ZITHROMAX) 250 MG tablet Two tablets first day, then one tablet daily for four days.  Can repeat immediately if you desire. 6 tablet 3     Cholecalciferol (VITAMIN D) 1000 UNITS capsule Take 2 capsules by mouth daily        acetaminophen-codeine (TYLENOL #3) 300-30 MG per tablet Take 1 tablet by mouth every 4 hours as needed for pain maximum 10  tablet(s) per day 90 tablet 0     Ibuprofen (ADVIL PO) Take 400 mg by mouth every 6 hours       ipratropium - albuterol 0.5 mg/2.5 mg/3 mL (DUONEB) 0.5-2.5 (3) MG/3ML nebulization Take 1 vial (3 mLs) by nebulization 4 times daily 1 Box 5     predniSONE (DELTASONE) 20 MG tablet Take 2 pills (40 mg) daily x 3 days, then 1.5 pills (30 mg) daily x 3 days, then 1 pill (20 mg) daily x 3 days, then 1/2 pill (10 mg) daily x 3 days. 14 tablet 1     ORDER FOR DME NEBULIZER machine 1 Device 0     ORDER FOR DME Oxygen 2.0 -4.0 liters n/c continuous       COLACE 100 MG OR CAPS 1 CAPSULE ORALLY DAILY AS NEEDED 60 0     Allergies   Allergen Reactions     Vfend [Voriconazole] Other (See Comments)     Numbness and tremors     Alendronic Acid Other (See Comments) and Nausea     Headache     Amoxicillin-Pot Clavulanate Nausea     Bactrim [Sulfamethoxazole W-Trimethoprim] Other (See Comments) and Difficulty breathing     And bloody nose     Boniva [Ibandronate Sodium] Nausea     Clarithromycin Other (See Comments)     Insomnia     Fosamax Other (See Comments)     headache     Ibandronic Acid Nausea     Other reaction(s): Headache     Paroxetine Other (See Comments)     Increased hand tremors     Raloxifene Nausea     Other reaction(s): Headache     Raloxifene Hcl  [Raloxifene Hydrochloride] Other (See Comments)     Headache     Tetracycline Nausea and Vomiting     Augmentin Nausea     Biaxin [Clarithromycin] Other (See Comments)     Insomnia     Paroxetine Other (See Comments)     Paxil. Possible increased tremors.     Quinolones Other (See Comments)     Avelox- bad taste in mouth, possible increased tremor  Cipro- bad taste in mouth     Tetracyclines Nausea and Vomiting       I have reviewed the Medications, Allergies, Past Medical and Surgical History, and Social History in the Epic system.    Review of Systems   Constitutional: Negative for fever.   HENT: Negative for congestion.    Respiratory:        O2 in use.  Lungs fairly clear.   Cardiovascular: Negative for chest pain.   Gastrointestinal: Negative for abdominal pain.   Musculoskeletal: Negative for back pain.        Pain and bruising on anterior right shin   Skin: Positive for rash.   Neurological: Negative.    Hematological: Does not bruise/bleed easily.   Psychiatric/Behavioral: Negative.        Physical Exam   BP: 152/81  Pulse: 88  Temp: 98  F (36.7  C)  Resp: 22  SpO2: 95 %  Physical Exam   Constitutional: No distress.   HENT:   Head: Normocephalic.   Eyes: No scleral icterus.   Neck: No thyromegaly present.   Cardiovascular: Normal rate and regular rhythm.    No murmur heard.  Pulmonary/Chest: Effort normal and breath sounds normal. She has no wheezes. She has no rales.   Abdominal: She exhibits no distension.   Skin: Ecchymosis (R arm) noted.   Circular area of purple-vincent bruising and tenderness on distal R leg, just lateral to tibia. 6 cm virgil.   Not firm or warm.   Psychiatric: She has a normal mood and affect.       ED Course     ED Course     Procedures             Critical Care time:  none               Labs Ordered and Resulted from Time of ED Arrival Up to the Time of Departure from the ED - No data to display    Results for orders placed or performed during the hospital encounter of 05/28/17 (from the  past 24 hour(s))   Tib/Fib XR, right    Narrative    XR TIBIA & FIBULA RT 2 VW 5/28/2017 11:41 AM    COMPARISON: None.    HISTORY: Circular ecchymotic area distally, history of bone  metastases.      Impression    IMPRESSION: No fractures are seen in the right tibia or fibula. No  concerning lesions identified. The visualized joints are unremarkable.    FERCHO BOONE       Medications - No data to display    11:14 AM Patient assessed.    Assessments & Plan (with Medical Decision Making)     This patient has a circular ecchymotic area of distal R leg. It was not significantly tender to palpation. It is possible that this is a bruise and that she just doesn't recall an incident. A spontaneous hemorrhage is possible. Tib/fib x ray shows no bony lesion. I felt that this could be managed expectantly with F/U if changes develop. She is OK with this plan.        I have reviewed the nursing notes.    I have reviewed the findings, diagnosis, plan and need for follow up with the patient.    Discharge Medication List as of 5/28/2017 11:59 AM          Final diagnoses:   Spontaneous ecchymosis     This document serves as a record of the services and decisions personally performed and made by Chris Vazquez MD. It was created on HIS/HER behalf by Pili Garcia, a trained medical scribe. The creation of this document is based the provider's statements to the medical scribe.  Pili Garcia 11:04 AM 5/28/2017    Provider:   The information in this document, created by the medical scribe for me, accurately reflects the services I personally performed and the decisions made by me. I have reviewed and approved this document for accuracy prior to leaving the patient care area.  Chris Vazquez MD 11:04 AM 5/28/2017 5/28/2017   [facility] EMERGENCY DEPARTMENT     Chris Vazquez MD  05/28/17 [id number]

## 2017-06-27 PROBLEM — K92.2 GI BLEED: Status: ACTIVE | Noted: 2017-01-01

## 2017-06-27 PROBLEM — D72.829 LEUKOCYTOSIS: Status: ACTIVE | Noted: 2017-01-01

## 2017-06-27 PROBLEM — K92.2 LOWER GI BLEED: Status: ACTIVE | Noted: 2017-01-01

## 2017-06-27 NOTE — IP AVS SNAPSHOT
[facility]    [address] [address]    Phone:  [phone]    Fax:  [phone]                                       After Visit Summary   6/27/2017    Alethea Britt    MRN: [id number]           After Visit Summary Signature Page     I have received my discharge instructions, and my questions have been answered. I have discussed any challenges I see with this plan with the nurse or doctor.    ..........................................................................................................................................  Patient/Patient Representative Signature      ..........................................................................................................................................  Patient Representative Print Name and Relationship to Patient    ..................................................               ................................................  Date                                            Time    ..........................................................................................................................................  Reviewed by Signature/Title    ...................................................              ..............................................  Date                                                            Time

## 2017-06-27 NOTE — ED PROVIDER NOTES
History     Chief Complaint   Patient presents with     Rectal Bleeding     HPI  Alethea Britt is a 76 year old female with a history of COPD, lung cancer with bone metastasis, last crisis, and hyperlipidemia presents for evaluation of bright red blood per rectum.  Patient reports she had some abdominal cramping followed by a normal bowel movement and then multiple episodes of bright red blood overnight tonight.  Patient reports increasing generalized weakness but denies dyspnea, lightheadedness, dizziness, or chest pain.  No previous history of GI bleeds.    I have reviewed the Medications, Allergies, Past Medical and Surgical History, and Social History in the Epic system.    Allergies:   Allergies   Allergen Reactions     Vfend [Voriconazole] Other (See Comments)     Numbness and tremors     Alendronic Acid Other (See Comments) and Nausea     Headache     Amoxicillin-Pot Clavulanate Nausea     Bactrim [Sulfamethoxazole W-Trimethoprim] Other (See Comments) and Difficulty breathing     And bloody nose     Boniva [Ibandronate Sodium] Nausea     Clarithromycin Other (See Comments)     Insomnia     Fosamax Other (See Comments)     headache     Ibandronic Acid Nausea     Other reaction(s): Headache     Paroxetine Other (See Comments)     Increased hand tremors     Raloxifene Nausea     Other reaction(s): Headache     Raloxifene Hcl [Raloxifene Hydrochloride] Other (See Comments)     Headache     Tetracycline Nausea and Vomiting     Augmentin Nausea     Biaxin [Clarithromycin] Other (See Comments)     Insomnia     Paroxetine Other (See Comments)     Paxil. Possible increased tremors.     Quinolones Other (See Comments)     Avelox- bad taste in mouth, possible increased tremor  Cipro- bad taste in mouth     Tetracyclines Nausea and Vomiting         No current facility-administered medications on file prior to encounter.   Current Outpatient Prescriptions on File Prior to Encounter:  polyethylene glycol  (MIRALAX/GLYCOLAX) powder Take 1 capful by mouth daily as needed for constipation   acetaminophen (TYLENOL) 500 MG tablet Take 1,000 mg by mouth every 8 hours as needed for mild pain Rapid release   COMBIVENT RESPIMAT  MCG/ACT inhaler INHALE TWO TO THREE PUFFS INTO THE LUNGS EVERY 6 HOURS (Patient taking differently: INHALE TWO TO THREE PUFFS INTO THE LUNGS EVERY 2 HOURS)   acetaminophen-codeine (TYLENOL #3) 300-30 MG per tablet Take 1 tablet by mouth every 4 hours as needed for pain maximum 10  tablet(s) per day   Ibuprofen (ADVIL PO) Take 400 mg by mouth every 6 hours   ipratropium - albuterol 0.5 mg/2.5 mg/3 mL (DUONEB) 0.5-2.5 (3) MG/3ML nebulization Take 1 vial (3 mLs) by nebulization 4 times daily   budesonide (PULMICORT FLEXHALER) 180 MCG/ACT inhaler Inhale 2 puffs into the lungs 2 times daily   ranitidine (ZANTAC) 150 MG tablet Take 1 tab 1-2 times per day. (Patient taking differently: Take 150 mg by mouth 2 times daily as needed Take 1 tab 1-2 times per day.)   predniSONE (DELTASONE) 20 MG tablet Take 2 pills (40 mg) daily x 3 days, then 1.5 pills (30 mg) daily x 3 days, then 1 pill (20 mg) daily x 3 days, then 1/2 pill (10 mg) daily x 3 days.   azithromycin (ZITHROMAX) 250 MG tablet Two tablets first day, then one tablet daily for four days.  Can repeat immediately if you desire.   Cholecalciferol (VITAMIN D) 1000 UNITS capsule Take 2 capsules by mouth daily    ORDER FOR DME NEBULIZER machine   ORDER FOR DME Oxygen 2.0 -4.0 liters n/c continuous   COLACE 100 MG OR CAPS 1 CAPSULE ORALLY DAILY AS NEEDED       Patient Active Problem List   Diagnosis     Osteoporosis     Chronic airway obstruction (H)     Esophageal reflux     Lumbago     Disturbance of skin sensation     GUILLIAN BARRE     Hyperlipidemia LDL goal <160     z-Advanced directives, counseling/discussion     Health Care Home     Lung cancer     Weakness and numbness     CIDP (chronic inflammatory demyelinating polyneuropathy) (H)     Vitamin  "B12 deficiency disease     Health care maintenance -- DO NOT CALL     Anisocoria     COPD (chronic obstructive pulmonary disease) (H)     Bone metastasis (H)       Past Surgical History:   Procedure Laterality Date     BIOPSY  9/2011    BCC - Right jawline & left medial arm     EYE SURGERY  12/2011    left eyelid reconstruction     SURGICAL HISTORY OF -   12/05    Normal  Colonoscopy       Social History   Substance Use Topics     Smoking status: Former Smoker     Packs/day: 1.00     Years: 35.00     Types: Cigarettes     Quit date: 5/13/1998     Smokeless tobacco: Never Used     Alcohol use Yes      Comment: glass/day which reduces my hand tremers       Most Recent Immunizations   Administered Date(s) Administered     Pneumococcal (PCV 13) 11/19/2015     Pneumococcal 23 valent 05/12/2008     TD (ADULT, 7+) 11/07/2008       BMI: Estimated body mass index is 19.49 kg/(m^2) as calculated from the following:    Height as of this encounter: 1.6 m (5' 3\").    Weight as of this encounter: 49.9 kg (110 lb).      Review of Systems   Constitutional: Positive for fatigue. Negative for appetite change, chills and fever.   HENT: Negative for congestion.    Respiratory: Positive for shortness of breath (chronic, at baseline). Negative for chest tightness.    Cardiovascular: Negative for chest pain and palpitations.   Gastrointestinal: Positive for blood in stool and diarrhea. Negative for abdominal pain, nausea and vomiting.   Genitourinary: Negative for dysuria.   Musculoskeletal: Negative for back pain.   Skin: Negative for rash.   Neurological: Positive for weakness (global). Negative for light-headedness and headaches.   All other systems reviewed and are negative.      Physical Exam   BP: 180/89  Pulse: 100  Temp: 98  F (36.7  C)  Resp: 18  Height: 160 cm (5' 3\")  Weight: 49.9 kg (110 lb)  SpO2: 94 %  Physical Exam   Constitutional: She is oriented to person, place, and time. She appears well-developed and well-nourished. " No distress.   HENT:   Head: Normocephalic and atraumatic.   Eyes: Conjunctivae are normal.   Cardiovascular: Normal rate and regular rhythm.    Pulmonary/Chest: Effort normal.   Abdominal: Soft. Normal appearance and bowel sounds are normal. There is tenderness in the right upper quadrant and epigastric area. There is no rigidity, no rebound and no guarding.   Musculoskeletal: Normal range of motion. She exhibits no edema.   Neurological: She is alert and oriented to person, place, and time.   Skin: Skin is warm and dry. She is not diaphoretic.   Psychiatric: She has a normal mood and affect.   Nursing note and vitals reviewed.      ED Course     ED Course     Procedures                  Results for orders placed or performed during the hospital encounter of 06/27/17   CT Abdomen Pelvis w Contrast    Narrative    CT ABDOMEN PELVIS W CONTRAST  6/27/2017 5:55 AM      HISTORY: GI bleeding. Right upper quadrant pain and tenderness.    TECHNIQUE: CT abdomen and pelvis with intravenous contrast. Radiation  dose for this scan was reduced using automated exposure control,  adjustment of the mA and/or kV according to patient size, or iterative  reconstruction technique. 53 mL Isovue-370.     COMPARISON: None.    FINDINGS:  Abdomen: There is emphysema and scarring at the lung bases. Two  indeterminate nodules at the left lung base measuring up to 2.1 cm.  Tiny probable calcified granuloma at the right lung base medially.  There is a moderate-sized hiatal hernia. The heart size is normal. The  liver and spleen are normal in appearance. There are multiple stones  in the gallbladder. The pancreas, adrenal glands and kidneys are  normal in appearance. There is no abdominal or pelvic lymph node  enlargement. There is atherosclerotic calcification of the aorta and  its branches. No aneurysm.    Pelvis: There is wall thickening of the colon extending from distal  transverse colon through proximal sigmoid colon. There are  sigmoid  diverticula without acute diverticulitis. No other bowel obstruction  or inflammation. The appendix is normal. No free intraperitoneal gas  or fluid. Lucent lesion in the posterior left ileum is not  significantly changed from 1/23/2017. Degenerative disease in the  spine.      Impression    IMPRESSION:  1. Nonspecific colitis from distal transverse colon through proximal  sigmoid colon.  2. Two left lung base nodules are indeterminate. These have increased  in size since the previous PET/CT of 10/30/2012. Further evaluation  with chest CT or PET/CT recommended.  3. Hiatal hernia.  4. Cholelithiasis.  5. Sigmoid diverticula without acute diverticulitis.   CBC with platelets differential   Result Value Ref Range    WBC 12.7 (H) 4.0 - 11.0 10e9/L    RBC Count 3.86 3.8 - 5.2 10e12/L    Hemoglobin 12.4 11.7 - 15.7 g/dL    Hematocrit 37.4 35.0 - 47.0 %    MCV 97 78 - 100 fl    MCH 32.1 26.5 - 33.0 pg    MCHC 33.2 31.5 - 36.5 g/dL    RDW 11.6 10.0 - 15.0 %    Platelet Count 335 150 - 450 10e9/L    Diff Method Automated Method     % Neutrophils 87.7 %    % Lymphocytes 4.6 %    % Monocytes 6.6 %    % Eosinophils 0.4 %    % Basophils 0.5 %    % Immature Granulocytes 0.2 %    Absolute Neutrophil 11.1 (H) 1.6 - 8.3 10e9/L    Absolute Lymphocytes 0.6 (L) 0.8 - 5.3 10e9/L    Absolute Monocytes 0.8 0.0 - 1.3 10e9/L    Absolute Eosinophils 0.1 0.0 - 0.7 10e9/L    Absolute Basophils 0.1 0.0 - 0.2 10e9/L    Abs Immature Granulocytes 0.0 0 - 0.4 10e9/L   Comprehensive metabolic panel   Result Value Ref Range    Sodium 135 133 - 144 mmol/L    Potassium 4.5 3.4 - 5.3 mmol/L    Chloride 103 94 - 109 mmol/L    Carbon Dioxide 25 20 - 32 mmol/L    Anion Gap 7 3 - 14 mmol/L    Glucose 133 (H) 70 - 99 mg/dL    Urea Nitrogen 14 7 - 30 mg/dL    Creatinine 0.63 0.52 - 1.04 mg/dL    GFR Estimate >90  Non  GFR Calc   >60 mL/min/1.7m2    GFR Estimate If Black >90   GFR Calc   >60 mL/min/1.7m2    Calcium 8.7  8.5 - 10.1 mg/dL    Bilirubin Total 0.6 0.2 - 1.3 mg/dL    Albumin 3.5 3.4 - 5.0 g/dL    Protein Total 7.1 6.8 - 8.8 g/dL    Alkaline Phosphatase 97 40 - 150 U/L    ALT 17 0 - 50 U/L    AST 14 0 - 45 U/L   ABO/Rh type and screen   Result Value Ref Range    ABO B     RH(D)  Pos     Antibody Screen Neg     Test Valid Only At [facility]     Specimen Expires 06/30/2017      6:41 AM: PT re-assessed. No recurrent bleeding here. CT shows colitis.     6:41 AM: Discussed with Dr Chan. Accepts for obs admission.    Assessments & Plan (with Medical Decision Making)  76-year-old female with history of metastatic colon cancer, COPD, and esophageal reflux and a for evaluation of bright red blood per rectum.  Patient reports abdominal cramping discomfort in the right upper quadrant and epigastric area with associated tenderness.  No symptoms of severe hemorrhage such as lightheadedness, dizziness, chest pain, difficulty breathing, or diaphoresis.  Initial vital signs showed mild hypertension with normal.  Hemoglobin down slightly from 13.3-12.4 tonight.  CT identified nonspecific colitis from the distal transverse colon through the proximal sigmoid colon as a likely source of her bleeding.  No recurrent episodes of GI bleed during her ED visit.  Patient remained hemodynamically stable with no significant change in blood pressure heart rate.  He shouldn't admitted to the hospital for serial hemoglobin monitoring and further evaluation.       I have reviewed the nursing notes.    I have reviewed the findings, diagnosis, plan and need for follow up with the patient.       New Prescriptions    No medications on file       Final diagnoses:   Colitis   Gastrointestinal hemorrhage, unspecified gastrointestinal hemorrhage type       6/27/2017   [facility] EMERGENCY DEPARTMENT     Sullivan, Mark Salcido MD  06/27/17 [id number]

## 2017-06-27 NOTE — IP AVS SNAPSHOT
"    [facility] SURGICAL: [phone]                                              INTERAGENCY TRANSFER FORM - PHYSICIAN ORDERS   2017                    Hospital Admission Date: 2017  ARRON BRADY   : 1940  Sex: Female        Attending Provider: (none)    Allergies:  Vfend [Voriconazole], Alendronic Acid, Amoxicillin-pot Clavulanate, Bactrim [Sulfamethoxazole W-trimethoprim], Boniva [Ibandronate Sodium], Clarithromycin, Fosamax, Ibandronic Acid, Paroxetine, Raloxifene, Raloxifene Hcl [Raloxifene Hydrochloride], Tetracycline, Augmentin, Biaxin [Clarithromycin], Paroxetine, Quinolones, Tetracyclines    Infection:  None   Service:  INTERNAL MED    Ht:  1.6 m (5' 3\")   Wt:  53.6 kg (118 lb 2.7 oz)   Admission Wt:  49.9 kg (110 lb)    BMI:  20.93 kg/m 2   BSA:  1.54 m 2            Patient PCP Information     Provider PCP Type    Amarilis Almaguer PA-C General      ED Clinical Impression     Diagnosis Description Comment Added By Time Added    Colitis [K52.9] Colitis [K52.9]  Mark Sullivan MD 2017  6:48 AM    Gastrointestinal hemorrhage, unspecified gastrointestinal hemorrhage type [K92.2] Gastrointestinal hemorrhage, unspecified gastrointestinal hemorrhage type [K92.2]  Mark Sullivan MD 2017  6:48 AM      Hospital Problems as of 2017              Priority Class Noted POA    Esophageal reflux Medium  Unknown Yes    Hyperlipidemia LDL goal <160 Medium  10/31/2010 Yes    Lung cancer   2012 Yes    COPD (chronic obstructive pulmonary disease) (H)   2015 Yes    * (Principal)GI bleed Medium  2017 Yes    Leukocytosis Medium  2017 Yes    Lower GI bleed Medium  2017 Yes      Non-Hospital Problems as of 2017              Priority Class Noted    Chronic airway obstruction (H) High  Unknown    Osteoporosis Medium  Unknown    Lumbago Low  Unknown    Disturbance of skin sensation Low  Unknown    GUILLIAN BARRE Low  Unknown    " z-Advanced directives, counseling/discussion Low  3/28/2011    Health Care Home Low  7/10/2012    Weakness and numbness   10/1/2012    CIDP (chronic inflammatory demyelinating polyneuropathy) (H)   10/2/2012    Vitamin B12 deficiency disease   10/3/2012    Health care maintenance -- DO NOT CALL   9/29/2014    Anisocoria   9/29/2014    Bone metastasis (H) Medium  1/31/2017      Code Status History     Date Active Date Inactive Code Status Order ID Comments User Context    7/9/2012  7:24 PM 7/13/2012  3:57 PM Full Code [id number]  Madeline Ovalle, RN Inpatient    12/1/2011  6:16 PM 7/9/2012  7:24 PM DNR/DNI None discussed at clinic appointment Thad Hendricks MD Outpatient         Medication Review      CONTINUE these medications which may have CHANGED, or have new prescriptions. If we are uncertain of the size of tablets/capsules you have at home, strength may be listed as something that might have changed.        Dose / Directions Comments    * ipratropium - albuterol 0.5 mg/2.5 mg/3 mL 0.5-2.5 (3) MG/3ML neb solution   Commonly known as:  DUONEB   This may have changed:  Another medication with the same name was changed. Make sure you understand how and when to take each.        Dose:  3 mL   Take 1 vial (3 mLs) by nebulization 4 times daily   Quantity:  1 Box   Refills:  5        * COMBIVENT RESPIMAT  MCG/ACT inhaler   This may have changed:  See the new instructions.   Used for:  Chronic obstructive pulmonary disease, unspecified COPD type (H)   Generic drug:  Ipratropium-Albuterol        INHALE TWO TO THREE PUFFS INTO THE LUNGS EVERY 6 HOURS   Quantity:  4 g   Refills:  11        ranitidine 150 MG tablet   Commonly known as:  ZANTAC   This may have changed:    - how much to take  - how to take this  - when to take this  - reasons to take this  - additional instructions   Used for:  Gastroesophageal reflux disease without esophagitis        Take 1 tab 1-2 times per day.   Quantity:  60 tablet   Refills:   11        * Notice:  This list has 2 medication(s) that are the same as other medications prescribed for you. Read the directions carefully, and ask your doctor or other care provider to review them with you.      CONTINUE these medications which have NOT CHANGED        Dose / Directions Comments    acetaminophen 500 MG tablet   Commonly known as:  TYLENOL        Dose:  1000 mg   Take 1,000 mg by mouth every 8 hours as needed for mild pain Rapid release   Refills:  0        budesonide 180 MCG/ACT inhaler   Commonly known as:  PULMICORT FLEXHALER   Used for:  Chronic obstructive pulmonary disease, unspecified COPD type (H)        Dose:  2 puff   Inhale 2 puffs into the lungs 2 times daily   Quantity:  1 Inhaler   Refills:  11        COLACE 100 MG capsule   Generic drug:  docusate sodium        1 CAPSULE ORALLY DAILY AS NEEDED   Quantity:  60   Refills:  0        * order for DME   Used for:  Chronic airway obstruction, not elsewhere classified        Oxygen 2.0 -4.0 liters n/c continuous   Refills:  0        * order for DME   Used for:  COPD with acute exacerbation (H), Cough        NEBULIZER machine   Quantity:  1 Device   Refills:  0        polyethylene glycol powder   Commonly known as:  MIRALAX/GLYCOLAX        Dose:  1 capful   Take 1 capful by mouth daily as needed for constipation   Refills:  0        VITAMIN D (CHOLECALCIFEROL) PO        Dose:  2000 Units   Take 2,000 Units by mouth daily   Refills:  0        * Notice:  This list has 2 medication(s) that are the same as other medications prescribed for you. Read the directions carefully, and ask your doctor or other care provider to review them with you.      STOP taking     ADVIL PO                     Further instructions from your care team            Discharge Instructions and Follow-Up:   Discharge diet: Regular   Discharge activity: Activity as tolerated   Discharge follow-up: Follow up with primary care provider in 2 days, recheck hgb at that time.           Referrals     Home Care Referral       Your provider has referred you to: FMG: [facility] [phone]   [email or web address]    Extended Emergency Contact Information  Primary Emergency Contact: Carson Britt  Address: [address] [place] [address] [facility]  Home Phone: [phone]  Relation: Spouse  Secondary Emergency Contact: kathia bacon   [facility]  Home Phone: [phone]  Work Phone: [phone]  Relation: Daughter    Patient Anticipated Discharge Date: 6/28/17   RN, PT, HHA to begin 24 - 48 hours after discharge.  PLEASE EVALUATE AND TREAT (Evaluation timeline is 24 - 48 hrs. Please call if there is need for a variance to this timeline).    REASON FOR REFERRAL: Assessment & Treatment: PT and RN    ADDITIONAL SERVICES NEEDED: None    OTHER PERTINENT INFORMATION: Patient was last seen by provider on 6/28/17 for colitis.    Current Outpatient Prescriptions:  VITAMIN D, CHOLECALCIFEROL, PO, Take 2,000 Units by mouth daily, Disp: , Rfl:   polyethylene glycol (MIRALAX/GLYCOLAX) powder, Take 1 capful by mouth daily as needed for constipation, Disp: , Rfl:   ranitidine (ZANTAC) 150 MG tablet, Take 1 tab 1-2 times per day. (Patient taking differently: Take 150 mg by mouth 2 times daily as needed Take 1 tab 1-2 times per day.), Disp: 60 tablet, Rfl: 11  ORDER FOR DME, Oxygen 2.0 -4.0 liters n/c continuous, Disp: , Rfl:   COLACE 100 MG OR CAPS, 1 CAPSULE ORALLY DAILY AS NEEDED, Disp: 60, Rfl: 0  acetaminophen (TYLENOL) 500 MG tablet, Take 1,000 mg by mouth every 8 hours as needed for mild pain Rapid release, Disp: , Rfl:   COMBIVENT RESPIMAT  MCG/ACT inhaler, INHALE TWO TO THREE PUFFS INTO THE LUNGS EVERY 6 HOURS (Patient taking differently: INHALE TWO TO THREE PUFFS INTO THE LUNGS EVERY 2 HOURS), Disp: 4 g, Rfl: 11  ipratropium - albuterol 0.5 mg/2.5 mg/3 mL (DUONEB)  0.5-2.5 (3) MG/3ML nebulization, Take 1 vial (3 mLs) by nebulization 4 times daily, Disp: 1 Box, Rfl: 5  budesonide (PULMICORT FLEXHALER) 180 MCG/ACT inhaler, Inhale 2 puffs into the lungs 2 times daily, Disp: 1 Inhaler, Rfl: 11  ORDER FOR DME, NEBULIZER machine, Disp: 1 Device, Rfl: 0      Patient Active Problem List:     Osteoporosis     Chronic airway obstruction (H)     Esophageal reflux     Lumbago     Disturbance of skin sensation     GUILLIAN BARRE     Hyperlipidemia LDL goal <160     z-Advanced directives, counseling/discussion     Health Care Home     Lung cancer     Weakness and numbness     CIDP (chronic inflammatory demyelinating polyneuropathy) (H)     Vitamin B12 deficiency disease     Health care maintenance -- DO NOT CALL     Anisocoria     COPD (chronic obstructive pulmonary disease) (H)     Bone metastasis (H)     GI bleed     Leukocytosis     Lower GI bleed      Documentation of Face to Face and Certification for Home Health Services    I certify that patient, Alethea Britt is under my care and that I, or a Nurse Practitioner or Physician's Assistant working with me, had a face-to-face encounter that meets the physician face-to-face encounter requirements with this patient on: 6/28/2017.    This encounter with the patient was in whole, or in part, for the following medical condition, which is the primary reason for Home Health Care: Weakness.    I certify that, based on my findings, the following services are medically necessary Home Health Services: Nursing and Physical Therapy    My clinical findings support the need for the above services because: Physical Therapy Services are needed to assess and treat the following functional impairments: Weakness.    Further, I certify that my clinical findings support that this patient is homebound (i.e. absences from home require considerable and taxing effort and are for medical reasons or Denominational services or infrequently or of short duration when for  other reasons) because: Requires assistance of another person or specialized equipment to access medical services because patient: Requires supervision of another for safe transfer..    Based on the above findings, I certify that this patient is confined to the home and needs intermittent skilled nursing care, physical therapy and/or speech therapy.  The patient is under my care, and I have initiated the establishment of the plan of care.  This patient will be followed by a physician who will periodically review the plan of care.    Physician/Provider to provide follow up care: Amarilis Almaguer certified Physician at time of discharge: Dr. Chan    Please be aware that coverage of these services is subject to the terms and limitations of your health insurance plan.  Call member services at your health plan with any benefit or coverage questions.             Your next 10 appointments already scheduled     Jun 30, 2017  1:40 PM CDT   Office Visit with Amarilis Almaguer PA-C   [facility]    [address] [place] [id number] [phone]           Bring a current list of meds and any records pertaining to this visit.  For Physicals, please bring immunization records and any forms needing to be filled out.  Please arrive 10 minutes early to complete paperwork.              Statement of Approval     Ordered          06/28/17 1323  I have reviewed and agree with all the recommendations and orders detailed in this document.  EFFECTIVE NOW     Approved and electronically signed by:  Davonte Chan MD

## 2017-06-27 NOTE — LETTER
Transition Communication Hand-off for Care Transitions to Next Level of Care Provider    Name: Alethea Britt  MRN #: [id number]  Primary Care Provider: Amarilis Almaguer  Primary Care MD Name:  ([facility])  Primary Clinic: [facility] [address] [facility] [address]  Primary Care Clinic Name:  ([facility])  Reason for Hospitalization:  Colitis [K52.9]  Gastrointestinal hemorrhage, unspecified gastrointestinal hemorrhage type [K92.2]  Admit Date/Time: 6/27/2017  4:53 AM  Discharge Date: 6/28/17  Payor Source: Payor: [employer] / Plan: [employer] / Product Type: HMO /     Readmission Assessment Measure (ROSS) Risk Score/category: none           Reason for Communication Hand-off Referral: Fragility    Discharge Plan:home care       Concern for non-adherence with plan of care:   Y/N no  Discharge Needs Assessment:      Already enrolled in Tele-monitoring program and name of program:  no  Follow-up specialty is recommended: No    Follow-up plan:  Future Appointments  Date Time Provider Department Center   6/30/2017 1:40 PM Amarilis Almaguer, PAIshC [facility]       Any outstanding tests or procedures:        Referrals     Future Labs/Procedures    Home Care Referral     Comments:    Your provider has referred you to: [facility]: [facility] [phone]   [email or web address]    Extended Emergency Contact Information  Primary Emergency Contact: Carson Britt  Address: [address] [place] [address] [facility]  Home Phone: [phone]  Relation: Spouse  Secondary Emergency Contact: kathia bacon   [facility]  Home Phone: [phone]  Work Phone: [phone]  Relation: Daughter    Patient Anticipated Discharge Date: 6/28/17   RN, PT, HHA to begin 24 - 48 hours after discharge.  PLEASE EVALUATE AND TREAT (Evaluation timeline is 24 - 48 hrs. Please call if there  is need for a variance to this timeline).    REASON FOR REFERRAL: Assessment & Treatment: PT and RN    ADDITIONAL SERVICES NEEDED: None    OTHER PERTINENT INFORMATION: Patient was last seen by provider on 6/28/17 for colitis.    Current Outpatient Prescriptions:  VITAMIN D, CHOLECALCIFEROL, PO, Take 2,000 Units by mouth daily, Disp: , Rfl:   polyethylene glycol (MIRALAX/GLYCOLAX) powder, Take 1 capful by mouth daily as needed for constipation, Disp: , Rfl:   ranitidine (ZANTAC) 150 MG tablet, Take 1 tab 1-2 times per day. (Patient taking differently: Take 150 mg by mouth 2 times daily as needed Take 1 tab 1-2 times per day.), Disp: 60 tablet, Rfl: 11  ORDER FOR DME, Oxygen 2.0 -4.0 liters n/c continuous, Disp: , Rfl:   COLACE 100 MG OR CAPS, 1 CAPSULE ORALLY DAILY AS NEEDED, Disp: 60, Rfl: 0  acetaminophen (TYLENOL) 500 MG tablet, Take 1,000 mg by mouth every 8 hours as needed for mild pain Rapid release, Disp: , Rfl:   COMBIVENT RESPIMAT  MCG/ACT inhaler, INHALE TWO TO THREE PUFFS INTO THE LUNGS EVERY 6 HOURS (Patient taking differently: INHALE TWO TO THREE PUFFS INTO THE LUNGS EVERY 2 HOURS), Disp: 4 g, Rfl: 11  ipratropium - albuterol 0.5 mg/2.5 mg/3 mL (DUONEB) 0.5-2.5 (3) MG/3ML nebulization, Take 1 vial (3 mLs) by nebulization 4 times daily, Disp: 1 Box, Rfl: 5  budesonide (PULMICORT FLEXHALER) 180 MCG/ACT inhaler, Inhale 2 puffs into the lungs 2 times daily, Disp: 1 Inhaler, Rfl: 11  ORDER FOR DME, NEBULIZER machine, Disp: 1 Device, Rfl: 0      Patient Active Problem List:     Osteoporosis     Chronic airway obstruction (H)     Esophageal reflux     Lumbago     Disturbance of skin sensation     GUILLIAN BARRE     Hyperlipidemia LDL goal <160     z-Advanced directives, counseling/discussion     Health Care Home     Lung cancer     Weakness and numbness     CIDP (chronic inflammatory demyelinating polyneuropathy) (H)     Vitamin B12 deficiency disease     Health care maintenance -- DO NOT CALL      Anisocoria     COPD (chronic obstructive pulmonary disease) (H)     Bone metastasis (H)     GI bleed     Leukocytosis     Lower GI bleed      Documentation of Face to Face and Certification for Home Health Services    I certify that patient, Alethea Britt is under my care and that I, or a Nurse Practitioner or Physician's Assistant working with me, had a face-to-face encounter that meets the physician face-to-face encounter requirements with this patient on: 6/28/2017.    This encounter with the patient was in whole, or in part, for the following medical condition, which is the primary reason for Home Health Care: Weakness.    I certify that, based on my findings, the following services are medically necessary Home Health Services: Nursing and Physical Therapy    My clinical findings support the need for the above services because: Physical Therapy Services are needed to assess and treat the following functional impairments: Weakness.    Further, I certify that my clinical findings support that this patient is homebound (i.e. absences from home require considerable and taxing effort and are for medical reasons or Cheondoism services or infrequently or of short duration when for other reasons) because: Requires assistance of another person or specialized equipment to access medical services because patient: Requires supervision of another for safe transfer..    Based on the above findings, I certify that this patient is confined to the home and needs intermittent skilled nursing care, physical therapy and/or speech therapy.  The patient is under my care, and I have initiated the establishment of the plan of care.  This patient will be followed by a physician who will periodically review the plan of care.    Physician/Provider to provide follow up care: Amarilis Almaguer certified Physician at time of discharge: Dr. Chan    Please be aware that coverage of these services is subject to the  terms and limitations of your health insurance plan.  Call member services at your health plan with any benefit or coverage questions.            Key Recommendations:  Pt discharged home today with spouse, agreeable to home care, referral placed to [facility] ([phone] Fax: [phone])    Yokasta ROBLES, [facility], [facility] [phone]    AVS/Discharge Summary is the source of truth; this is a helpful guide for improved communication of patient story

## 2017-06-27 NOTE — ED NOTES
".Patient has  [place] to Observation  order. Patient has been given the Observation brochure -  What does Observation mean to me.\"  Patient has been given the opportunity to ask questions about observation status and their plan of care.  , Kelvin, present with discussion.    Coby Meyers  "

## 2017-06-27 NOTE — PROGRESS NOTES
[facility]    Hospital Medicine   Care update  Date of Service: 6/27/2017     I was called due to request for tylenol.  Pt has home Rx for tylenol #3; she would rather have extra strength tylenol.    Plan: order placed, 1000mg acetaminophen, Q6 PRN  - dc tylenol #3      Alka Mi PA-C

## 2017-06-27 NOTE — ED NOTES
Pt was constipated yesterday and today began having bright red stools, with cramping, and abdominal pain.

## 2017-06-27 NOTE — H&P
"[facility]    History and Physical  Hospital Medicine       Date of Admission:  6/27/2017  Date of Service: 6/27/2017     Assessment & Plan   Alethea Britt is a 76 year old female with PMH significant for metastatic lung cancer s/p palliative radiation, GERD, HLD, and COPD who now presents with abdominal cramping and subsequent bright red blood per rectum.    Lower GI Bleed secondary to presumed colitis related  Acute Diarrhea  DDx: colitis (infectious, ischemic, inflammatory)  Hbg 12.4 on arrival.  Baseline 13-14. VSS. CT shows \"nonspecific colitis from distal transverse colon through proximal sigmoid colon.   Sigmoid diverticula without acute diverticulitis.\"  - 2 large bore IV's in place, continue IVF of LR 100mL/hr  - serial hgb's Q6, transfuse if <7.0  - enteric stool panel ordered  - Protonix 80mg IV bolus given and then 40mg IV, Q12 Protonix  - no need for surgery consultation at this point in time, given possible colitis causing bleeding      Leukocytosis  DDx: stress versus colitis (infectious)  - CBC in AM  - vitals Q4    Elevated Fasting Glucose  - add on A1c    Alcohol Dependence  Reports drinking 2-3 glasses of wine/day (for \"tremors\", estimates 4-5oz poor)  On admission, appears to not be going through withdrawals  - CIWA protocol in place  - thiamine, folic acid, multivitamin, magnesium/phosphorus therapy initiated (in IV given NPO)  - initiate lorazepam therapy, PRN    Esophageal reflux  - hold home ranitidine given above zantac      Hyperlipidemia LDL goal <160  Not currently utilizing medications    Lung cancer  Followed by Dr. Duncan.  No previous chemo.  On palliative radiation at present for metastatic lesion to left iliac crest.  Finished radiation 02/10/2017.  Has never undergone lung nodule biopsy, but this is presumed NSCLC.  CT today shows \"two left lung base nodules that have increased in size since previous PET/CT 10/30/2012.  Further eval with CT or PET/CT " "recommended.\"  - continue PTA tylenol #3  - hold home ibuprofen given GI bleed      COPD (chronic obstructive pulmonary disease) (H)  Chronic Hypoxemic Respiratory Failure  Last exacerbation 11/2016.  On 4L NC oxygen daily at home  - schedule duoneb Q3 hours per patient request (states she has duoneb or combivent at least 8-10 times daily at home)  - transition pulmicort inhaler to nebulizer per patient request       F: 100cc/hr D5 in LR  E: BMP in AM  N: clear liquid diet  DVT Prophylaxis: not indicated    Code Status: DNR / DNI    Disposition: Anticipate discharge in 1-2 days. Appropriate for observation care.    Case discussed with Dr. Davonte Chan.  Assessment and plan as written above.    Alka Mi PA-C  [facility]    History is obtained from the patient and review of the EMR.    Past Medical History    Past Medical History:   Diagnosis Date     Basal cell carcinoma     right jaw, left arm 9/11.      Bell's palsy     Left 1999     Calculus of gallbladder without mention of cholecystitis or obstruction      COPD (chronic obstructive pulmonary disease) (H)      Empyema without mention of fistula     History of Strep pneomoniae pneumonia with pnemothorax and parapneumonic abcess requiring chest tube drainage 1998     GBS (Guillain Doucette syndrome) (H)      Other dyspnea and respiratory abnormality     Dyspnea. Cardiolite normal 2002. Dobutamine echo normal 2003.     Vitamin D deficiency 3/5/2010    resolved 3/12       Past Surgical History   Past Surgical History:   Procedure Laterality Date     BIOPSY  9/2011    BCC - Right jawline & left medial arm     EYE SURGERY  12/2011    left eyelid reconstruction     SURGICAL HISTORY OF -   12/05    Normal  Colonoscopy       Family History    Family History   Problem Relation Age of Onset     C.A.D. Sister      MI age 40     HEART DISEASE Sister      DIABETES Maternal Grandmother      CEREBROVASCULAR DISEASE Maternal Grandmother      " "Alcohol/Drug Maternal Grandmother      Hypertension Father      Alcohol/Drug Father      Depression Father      Lipids Father      Respiratory Father      copd     Hyperlipidemia Father      Hypertension Mother      Alcohol/Drug Mother      GASTROINTESTINAL DISEASE Mother      reflux/bowel ressection/polyps     Respiratory Mother      asthma     Thyroid Disease Mother      CANCER Mother      lung cancer     Eye Disorder Mother      macular degeneration     Neurologic Disorder Mother      hand tremorers     Circulatory Mother      HEART DISEASE Mother      Hyperlipidemia Mother      Other Cancer Mother      Alcohol/Drug Paternal Grandmother      Alcohol/Drug Maternal Grandfather      Alcohol/Drug Brother      CANCER Brother      skin ca     Neurologic Disorder Brother      hand tremors     Depression Brother      Substance Abuse Brother      Thyroid Disease Daughter      Cancer - colorectal No family hx of      Breast Cancer No family hx of        History of Present Illness   Alethea Britt is a 76 year old female with PMH significant for metastatic lung cancer s/p palliative radiation, GERD, HLD, and COPD who now presents with abdominal cramping and subsequent bright red blood per rectum.    The patient reports she was \"constipated\" yesterday.  She did not take anything for this.  She reports that at about 7pm she developed abdominal \"cramping\" which would come and go.  She reports that the pain was \"not bad\" and non-specific in regards to location.  Shortly thereafter, she had a bowel movement.  She reports that immediately after passing her formed stool, she passed diarrhea.  She denies blood or mucus in the stool at that point in time.  Several hours later, she felt that she again needed to have a bowel movement; she then reports having 2-3 diarrhea stools all of which contained blood.  She reports she has had minimal blood in bowel movements in the past, but nothing this significant before.  She denies recent " "travel, sick contacts, or new foods.  The patient lives with her  whom does not have any such symptoms.  She noted mild weakness and lightheadedness immediately after her bloody bowel movements.  She otherwise denies fever, chills, nausea, vomiting, myalgias, swollen glands, headaches,dizziness, chest pain, palpitations/flutters, SOB, cough, wheezes, dysuria, hematuria, joint swelling, joint pain, leg swelling, and rashes.    The patient drinks 2-3 glasses of wine daily for her \"tremors\".  Quit smoking in .  She has chronic tremors which are unchanged from baseline.  She is a O2 dependent COPD patient (4L chronically).  She is DNR/DNI      Prior to Admission Medications   Prior to Admission Medications   Prescriptions Last Dose Informant Patient Reported? Taking?   COLACE 100 MG OR CAPS  Self Yes No   Si CAPSULE ORALLY DAILY AS NEEDED   COMBIVENT RESPIMAT  MCG/ACT inhaler  Self No No   Sig: INHALE TWO TO THREE PUFFS INTO THE LUNGS EVERY 6 HOURS   Patient taking differently: INHALE TWO TO THREE PUFFS INTO THE LUNGS EVERY 2 HOURS   Cholecalciferol (VITAMIN D) 1000 UNITS capsule  Self Yes No   Sig: Take 2 capsules by mouth daily    Ibuprofen (ADVIL PO)  Self Yes No   Sig: Take 400 mg by mouth every 6 hours   ORDER FOR DME  Self Yes No   Sig: Oxygen 2.0 -4.0 liters n/c continuous   ORDER FOR DME  Self No No   Sig: NEBULIZER machine   acetaminophen (TYLENOL) 500 MG tablet  Self Yes No   Sig: Take 1,000 mg by mouth every 8 hours as needed for mild pain Rapid release   acetaminophen-codeine (TYLENOL #3) 300-30 MG per tablet  Self No No   Sig: Take 1 tablet by mouth every 4 hours as needed for pain maximum 10  tablet(s) per day   azithromycin (ZITHROMAX) 250 MG tablet  Self No No   Sig: Two tablets first day, then one tablet daily for four days.  Can repeat immediately if you desire.   budesonide (PULMICORT FLEXHALER) 180 MCG/ACT inhaler  Self No No   Sig: Inhale 2 puffs into the lungs 2 times daily "   ipratropium - albuterol 0.5 mg/2.5 mg/3 mL (DUONEB) 0.5-2.5 (3) MG/3ML nebulization  Self No No   Sig: Take 1 vial (3 mLs) by nebulization 4 times daily   polyethylene glycol (MIRALAX/GLYCOLAX) powder  Self Yes No   Sig: Take 1 capful by mouth daily as needed for constipation   predniSONE (DELTASONE) 20 MG tablet  Self No No   Sig: Take 2 pills (40 mg) daily x 3 days, then 1.5 pills (30 mg) daily x 3 days, then 1 pill (20 mg) daily x 3 days, then 1/2 pill (10 mg) daily x 3 days.   ranitidine (ZANTAC) 150 MG tablet  Self No No   Sig: Take 1 tab 1-2 times per day.   Patient taking differently: Take 150 mg by mouth 2 times daily as needed Take 1 tab 1-2 times per day.      Facility-Administered Medications: None       Allergies   Allergies   Allergen Reactions     Vfend [Voriconazole] Other (See Comments)     Numbness and tremors     Alendronic Acid Other (See Comments) and Nausea     Headache     Amoxicillin-Pot Clavulanate Nausea     Bactrim [Sulfamethoxazole W-Trimethoprim] Other (See Comments) and Difficulty breathing     And bloody nose     Boniva [Ibandronate Sodium] Nausea     Clarithromycin Other (See Comments)     Insomnia     Fosamax Other (See Comments)     headache     Ibandronic Acid Nausea     Other reaction(s): Headache     Paroxetine Other (See Comments)     Increased hand tremors     Raloxifene Nausea     Other reaction(s): Headache     Raloxifene Hcl [Raloxifene Hydrochloride] Other (See Comments)     Headache     Tetracycline Nausea and Vomiting     Augmentin Nausea     Biaxin [Clarithromycin] Other (See Comments)     Insomnia     Paroxetine Other (See Comments)     Paxil. Possible increased tremors.     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 No     Social History Narrative    Lives with partner    ROS: 10 point ROS neg other than the symptoms noted above in the HPI.    Physical Exam     /63 (BP Location: Left arm)  Pulse 93  Temp 98  F (36.7  C) (Oral)  Resp 12  Ht 1.6 m (5' 3\")  Wt 53.6 kg (118 lb 2.7 oz)  SpO2 96%  BMI 20.93 kg/m2     Weight: 118 lbs 2.66 oz Body mass index is 20.93 kg/(m^2).     Constitutional: Alert and oriented x4.  Cooperative.  Appears stated age.  Appears in no acute distress. Obvious intentional tremor with any type of movement  HEENT: Oropharynx is clear and moist. No evidence of cranial trauma.  Lymph/Hematologic: No occipital, submental, submandibular, anterior or posterior cervical, or supraclavicular lymphadenopathy is appreciated.  Cardiovascular: Regular rate/ rhythm.  S1 and S2 grossly normal.  No appreciable murmur, rub, gallop. no lower extremity edema.  Respiratory: Clear to auscultation bilaterally. Equal chest expansion.  GI: Soft, non-tender, normal bowel sounds, no hepatosplenomegaly.  Genitourinary: Deferred  Musculoskeletal: Normal muscle bulk and tone.  Skin: Warm and dry, no rashes.   Neurologic: Neck supple. Cranial nerves 3-12 are grossly intact.  is symmetric.     Data   Data reviewed today:     Recent Labs  Lab 06/27/17  0500   WBC 12.7*   HGB 12.4   MCV 97         POTASSIUM 4.5 "   CHLORIDE 103   CO2 25   BUN 14   CR 0.63   ANIONGAP 7   JESICA 8.7   *   ALBUMIN 3.5   PROTTOTAL 7.1   BILITOTAL 0.6   ALKPHOS 97   ALT 17   AST 14       Recent Results (from the past 24 hour(s))   CT Abdomen Pelvis w Contrast    Narrative    CT ABDOMEN PELVIS W CONTRAST  6/27/2017 5:55 AM      HISTORY: GI bleeding. Right upper quadrant pain and tenderness.    TECHNIQUE: CT abdomen and pelvis with intravenous contrast. Radiation  dose for this scan was reduced using automated exposure control,  adjustment of the mA and/or kV according to patient size, or iterative  reconstruction technique. 53 mL Isovue-370.     COMPARISON: None.    FINDINGS:  Abdomen: There is emphysema and scarring at the lung bases. Two  indeterminate nodules at the left lung base measuring up to 2.1 cm.  Tiny probable calcified granuloma at the right lung base medially.  There is a moderate-sized hiatal hernia. The heart size is normal. The  liver and spleen are normal in appearance. There are multiple stones  in the gallbladder. The pancreas, adrenal glands and kidneys are  normal in appearance. There is no abdominal or pelvic lymph node  enlargement. There is atherosclerotic calcification of the aorta and  its branches. No aneurysm.    Pelvis: There is wall thickening of the colon extending from distal  transverse colon through proximal sigmoid colon. There are sigmoid  diverticula without acute diverticulitis. No other bowel obstruction  or inflammation. The appendix is normal. No free intraperitoneal gas  or fluid. Lucent lesion in the posterior left ileum is not  significantly changed from 1/23/2017. Degenerative disease in the  spine.      Impression    IMPRESSION:  1. Nonspecific colitis from distal transverse colon through proximal  sigmoid colon.  2. Two left lung base nodules are indeterminate. These have increased  in size since the previous PET/CT of 10/30/2012. Further evaluation  with chest CT or PET/CT recommended.  3.  Hiatal hernia.  4. Cholelithiasis.  5. Sigmoid diverticula without acute diverticulitis.       Alka Mi [facility]

## 2017-06-27 NOTE — IP AVS SNAPSHOT
MRN:[id number]                      After Visit Summary   6/27/2017    Alethea Britt    MRN: [id number]           Thank you!     Thank you for choosing [facility] for your care. Our goal is always to provide you with excellent care. Hearing back from our patients is one way we can continue to improve our services. Please take a few minutes to complete the written survey that you may receive in the mail after you visit with us. Thank you!        Patient Information     Date Of Birth          1940        Designated Caregiver       Most Recent Value    Caregiver    Will someone help with your care after discharge? yes    Name of designated caregiver isaias    Phone number of caregiver [phone]    Caregiver address [address] [address]      About your hospital stay     You were admitted on:  June 27, 2017 You last received care in the:  [facility]    You were discharged on:  June 28, 2017       Who to Call     For medical emergencies, please call 911.  For non-urgent questions about your medical care, please call your primary care provider or clinic, [phone]          Attending Provider     Provider Specialty    Sullivan, Mark Salcido MD Emergency Medicine    [facility], Davonte VAUGHAN MD Family Practice       Primary Care Provider Office Phone # Fax #    Amarilis Almaguer PA-C [phone]      Your next 10 appointments already scheduled     Jun 30, 2017  1:40 PM CDT   Office Visit with Amarilis Almaguer PA-C   [facility]    [address] [place] [phone]           Bring a current list of meds and any records pertaining to this visit.  For Physicals, please bring immunization records and any forms needing to be filled out.  Please arrive 10 minutes early to complete paperwork.              Further instructions from your care team            Discharge  "Instructions and Follow-Up:   Discharge diet: Regular   Discharge activity: Activity as tolerated   Discharge follow-up: Follow up with primary care provider in 2 days, recheck hgb at that time.          Pending Results     No orders found for last 3 day(s).            Statement of Approval     Ordered          06/28/17 1323  I have reviewed and agree with all the recommendations and orders detailed in this document.  EFFECTIVE NOW     Approved and electronically signed by:  Davonte Chan MD             Admission Information     Date & Time Provider Department Dept. Phone    6/27/2017 Davonte Chan MD [facility] [phone]      Your Vitals Were     Blood Pressure Pulse Temperature Respirations Height Weight    153/81 (BP Location: Left arm) 81 98.7  F (37.1  C) (Oral) 18 1.6 m (5' 3\") 53.6 kg (118 lb 2.7 oz)    Pulse Oximetry BMI (Body Mass Index)                98% 20.93 kg/m2          [employer] Information     [employer] gives you secure access to your electronic health record. If you see a primary care provider, you can also send messages to your care team and make appointments. If you have questions, please call your primary care clinic.  If you do not have a primary care provider, please call [phone] and they will assist you.        Care EveryWhere ID     This is your Care EveryWhere ID. This could be used by other organizations to access your [facility] medical records  [id number]        Equal Access to Services     HERNANDEZ GONZALEZ : Hadii ric Bennett, liu gutierrez, qaybta catrina morris . So [facility] [phone].    ATENCIÓN: Si habla español, tiene a ziegler disposición servicios gratuitos de asistencia lingüística. Llame al [phone].    We comply with applicable federal civil rights laws and [place] laws. We do not discriminate on the basis of race, color, national origin, age, disability sex, sexual orientation or gender " identity.               Review of your medicines      CONTINUE these medicines which may have CHANGED, or have new prescriptions. If we are uncertain of the size of tablets/capsules you have at home, strength may be listed as something that might have changed.        Dose / Directions    * ipratropium - albuterol 0.5 mg/2.5 mg/3 mL 0.5-2.5 (3) MG/3ML neb solution   Commonly known as:  DUONEB   This may have changed:  Another medication with the same name was changed. Make sure you understand how and when to take each.        Dose:  3 mL   Take 1 vial (3 mLs) by nebulization 4 times daily   Quantity:  1 Box   Refills:  5       * COMBIVENT RESPIMAT  MCG/ACT inhaler   This may have changed:  See the new instructions.   Used for:  Chronic obstructive pulmonary disease, unspecified COPD type (H)   Generic drug:  Ipratropium-Albuterol        INHALE TWO TO THREE PUFFS INTO THE LUNGS EVERY 6 HOURS   Quantity:  4 g   Refills:  11       ranitidine 150 MG tablet   Commonly known as:  ZANTAC   This may have changed:    - how much to take  - how to take this  - when to take this  - reasons to take this  - additional instructions   Used for:  Gastroesophageal reflux disease without esophagitis        Take 1 tab 1-2 times per day.   Quantity:  60 tablet   Refills:  11       * Notice:  This list has 2 medication(s) that are the same as other medications prescribed for you. Read the directions carefully, and ask your doctor or other care provider to review them with you.      CONTINUE these medicines which have NOT CHANGED        Dose / Directions    acetaminophen 500 MG tablet   Commonly known as:  TYLENOL        Dose:  1000 mg   Take 1,000 mg by mouth every 8 hours as needed for mild pain Rapid release   Refills:  0       budesonide 180 MCG/ACT inhaler   Commonly known as:  PULMICORT FLEXHALER   Used for:  Chronic obstructive pulmonary disease, unspecified COPD type (H)        Dose:  2 puff   Inhale 2 puffs into the lungs 2  times daily   Quantity:  1 Inhaler   Refills:  11       COLACE 100 MG capsule   Generic drug:  docusate sodium        1 CAPSULE ORALLY DAILY AS NEEDED   Quantity:  60   Refills:  0       * order for DME   Used for:  Chronic airway obstruction, not elsewhere classified        Oxygen 2.0 -4.0 liters n/c continuous   Refills:  0       * order for DME   Used for:  COPD with acute exacerbation (H), Cough        NEBULIZER machine   Quantity:  1 Device   Refills:  0       polyethylene glycol powder   Commonly known as:  MIRALAX/GLYCOLAX        Dose:  1 capful   Take 1 capful by mouth daily as needed for constipation   Refills:  0       VITAMIN D (CHOLECALCIFEROL) PO        Dose:  2000 Units   Take 2,000 Units by mouth daily   Refills:  0       * Notice:  This list has 2 medication(s) that are the same as other medications prescribed for you. Read the directions carefully, and ask your doctor or other care provider to review them with you.      STOP taking     ADVIL PO                    Protect others around you: Learn how to safely use, store and throw away your medicines at www.disposemymeds.org.             Medication List: This is a list of all your medications and when to take them. Check marks below indicate your daily home schedule. Keep this list as a reference.      Medications           Morning Afternoon Evening Bedtime As Needed    acetaminophen 500 MG tablet   Commonly known as:  TYLENOL   Take 1,000 mg by mouth every 8 hours as needed for mild pain Rapid release   Last time this was given:  1,000 mg on 6/28/2017  2:15 PM                                   budesonide 180 MCG/ACT inhaler   Commonly known as:  PULMICORT FLEXHALER   Inhale 2 puffs into the lungs 2 times daily                                      COLACE 100 MG capsule   1 CAPSULE ORALLY DAILY AS NEEDED   Generic drug:  docusate sodium                                   * ipratropium - albuterol 0.5 mg/2.5 mg/3 mL 0.5-2.5 (3) MG/3ML neb solution    Commonly known as:  DUONEB   Take 1 vial (3 mLs) by nebulization 4 times daily   Last time this was given:  3 mLs on 6/28/2017  2:18 PM                                            * COMBIVENT RESPIMAT  MCG/ACT inhaler   INHALE TWO TO THREE PUFFS INTO THE LUNGS EVERY 6 HOURS   Generic drug:  Ipratropium-Albuterol   Last time this was given:  You had duoneb at 12:08 pm today                                            * order for DME   Oxygen 2.0 -4.0 liters n/c continuous                                * order for DME   NEBULIZER machine                                polyethylene glycol powder   Commonly known as:  MIRALAX/GLYCOLAX   Take 1 capful by mouth daily as needed for constipation   Last time this was given:  None given                                   ranitidine 150 MG tablet   Commonly known as:  ZANTAC   Take 1 tab 1-2 times per day.   Last time this was given:  None given                                      VITAMIN D (CHOLECALCIFEROL) PO   Take 2,000 Units by mouth daily   Last time this was given:  None given                                   * Notice:  This list has 4 medication(s) that are the same as other medications prescribed for you. Read the directions carefully, and ask your doctor or other care provider to review them with you.

## 2017-06-28 NOTE — PLAN OF CARE
"Problem: Gastrointestinal Bleeding (Adult)  Goal: Signs and Symptoms of Listed Potential Problems Will be Absent or Manageable (Gastrointestinal Bleeding)  Signs and symptoms of listed potential problems will be absent or manageable by discharge/transition of care (reference Gastrointestinal Bleeding (Adult) CPG).   Outcome: Improving  Patient is alert, oriented, SBA. IV saline locked. Prn tylenol given x 1. No stools overnight. Denies nausea, SOB. LS diminished. Has infrequent, nonproductive, coarse cough. Clear liquid diet. Pt has tremors at baseline. CIWA score 2. /49 (BP Location: Left arm)  Pulse 86  Temp 96.7  F (35.9  C) (Oral)  Resp 18  Ht 1.6 m (5' 3\")  Wt 53.6 kg (118 lb 2.7 oz)  SpO2 98%  BMI 20.93 kg/m2          "

## 2017-06-28 NOTE — PLAN OF CARE
Problem: Goal Outcome Summary  Goal: Goal Outcome Summary  Outcome: No Change  Updated Dr. Chan on bloody stool.

## 2017-06-28 NOTE — PLAN OF CARE
Problem: Goal Outcome Summary  Goal: Goal Outcome Summary  Outcome: Adequate for Discharge Date Met:  06/28/17  MICHELINE BOOKER DISCHARGE NOTE     Patient discharged to home at 2:45 PM via wheel chair. Accompanied by spouse and staff. Discharge instructions reviewed with patient and spouse, opportunity offered to ask questions. Prescriptions - None ordered for discharge. All belongings sent with patient.     Abeba Vences

## 2017-06-28 NOTE — PLAN OF CARE
Problem: Goal Outcome Summary  Goal: Goal Outcome Summary  Outcome: No Change  Patient had 3 tiny formed brown stools in approximately 25 ml blood. She denies abdominal discomfort.

## 2017-06-28 NOTE — PLAN OF CARE
Problem: Gastrointestinal Bleeding (Adult)  Goal: Signs and Symptoms of Listed Potential Problems Will be Absent or Manageable (Gastrointestinal Bleeding)  Signs and symptoms of listed potential problems will be absent or manageable by discharge/transition of care (reference Gastrointestinal Bleeding (Adult) CPG).   Outcome: Improving  Pt had no stools or blood noticed tonight.  Pt took tylenol once but has been comfortable this evening.  Voiding well.  adry clear liquids well.  CLEMENT Angela RN

## 2017-06-28 NOTE — DISCHARGE SUMMARY
[facility] Discharge Summary    Alethea Britt MRN# [id number]   Age: 76 year old YOB: 1940     Date of Admission:  6/27/2017  Date of Discharge::  6/28/2017  Admitting Physician:  Davonte Chan MD  Discharge Physician:  Davonte Chan MD, MD             Admission Diagnoses:   Colitis [K52.9]  Gastrointestinal hemorrhage, unspecified gastrointestinal hemorrhage type [K92.2]          Principle Discharge Diagnosis:     Acute colitis, suspect infectious     See hospital course for further active diagnoses addressed during this admission.            Procedures:   No procedures performed during this admission          Medications Prior to Admission:     Prescriptions Prior to Admission   Medication Sig Dispense Refill Last Dose     VITAMIN D, CHOLECALCIFEROL, PO Take 2,000 Units by mouth daily   6/26/2017     polyethylene glycol (MIRALAX/GLYCOLAX) powder Take 1 capful by mouth daily as needed for constipation   Past Month at Unknown time     ranitidine (ZANTAC) 150 MG tablet Take 1 tab 1-2 times per day. (Patient taking differently: Take 150 mg by mouth 2 times daily as needed Take 1 tab 1-2 times per day.) 60 tablet 11 Past Month at Unknown time     ORDER FOR DME Oxygen 2.0 -4.0 liters n/c continuous   6/28/2017 at Unknown time     COLACE 100 MG OR CAPS 1 CAPSULE ORALLY DAILY AS NEEDED 60 0 Past Week at Unknown time     acetaminophen (TYLENOL) 500 MG tablet Take 1,000 mg by mouth every 8 hours as needed for mild pain Rapid release   6/26/2017     COMBIVENT RESPIMAT  MCG/ACT inhaler INHALE TWO TO THREE PUFFS INTO THE LUNGS EVERY 6 HOURS (Patient taking differently: INHALE TWO TO THREE PUFFS INTO THE LUNGS EVERY 2 HOURS) 4 g 11 6/26/2017     Ibuprofen (ADVIL PO) Take 400 mg by mouth every 6 hours   6/26/2017     ipratropium - albuterol 0.5 mg/2.5 mg/3 mL (DUONEB) 0.5-2.5 (3) MG/3ML nebulization Take 1 vial (3 mLs) by nebulization 4 times daily 1 Box 5 More than a month at Unknown time      budesonide (PULMICORT FLEXHALER) 180 MCG/ACT inhaler Inhale 2 puffs into the lungs 2 times daily 1 Inhaler 11 6/26/2017     ORDER FOR DME NEBULIZER machine 1 Device 0 More than a month at Unknown time             Discharge Medications:     Current Discharge Medication List      CONTINUE these medications which have NOT CHANGED    Details   VITAMIN D, CHOLECALCIFEROL, PO Take 2,000 Units by mouth daily      polyethylene glycol (MIRALAX/GLYCOLAX) powder Take 1 capful by mouth daily as needed for constipation      ranitidine (ZANTAC) 150 MG tablet Take 1 tab 1-2 times per day.  Qty: 60 tablet, Refills: 11    Associated Diagnoses: Gastroesophageal reflux disease without esophagitis      !! ORDER FOR DME Oxygen 2.0 -4.0 liters n/c continuous    Associated Diagnoses: Chronic airway obstruction, not elsewhere classified      COLACE 100 MG OR CAPS 1 CAPSULE ORALLY DAILY AS NEEDED  Qty: 60, Refills: 0      acetaminophen (TYLENOL) 500 MG tablet Take 1,000 mg by mouth every 8 hours as needed for mild pain Rapid release      COMBIVENT RESPIMAT  MCG/ACT inhaler INHALE TWO TO THREE PUFFS INTO THE LUNGS EVERY 6 HOURS  Qty: 4 g, Refills: 11    Associated Diagnoses: Chronic obstructive pulmonary disease, unspecified COPD type (H)      ipratropium - albuterol 0.5 mg/2.5 mg/3 mL (DUONEB) 0.5-2.5 (3) MG/3ML nebulization Take 1 vial (3 mLs) by nebulization 4 times daily  Qty: 1 Box, Refills: 5      budesonide (PULMICORT FLEXHALER) 180 MCG/ACT inhaler Inhale 2 puffs into the lungs 2 times daily  Qty: 1 Inhaler, Refills: 11    Associated Diagnoses: Chronic obstructive pulmonary disease, unspecified COPD type (H)      !! ORDER FOR DME NEBULIZER machine  Qty: 1 Device, Refills: 0    Associated Diagnoses: COPD with acute exacerbation (H); Cough       !! - Potential duplicate medications found. Please discuss with provider.      STOP taking these medications       Ibuprofen (ADVIL PO) Comments:   Reason for Stopping:                      "Consultations:   No consultations were requested during this admission          Brief History of Illness:     From Admission H+P:   Alethea Britt is a 76 year old female with PMH significant for metastatic lung cancer s/p palliative radiation, GERD, HLD, and COPD who now presents with abdominal cramping and subsequent bright red blood per rectum.     The patient reports she was \"constipated\" yesterday.  She did not take anything for this.  She reports that at about 7pm she developed abdominal \"cramping\" which would come and go.  She reports that the pain was \"not bad\" and non-specific in regards to location.  Shortly thereafter, she had a bowel movement.  She reports that immediately after passing her formed stool, she passed diarrhea.  She denies blood or mucus in the stool at that point in time.  Several hours later, she felt that she again needed to have a bowel movement; she then reports having 2-3 diarrhea stools all of which contained blood.  She reports she has had minimal blood in bowel movements in the past, but nothing this significant before.  She denies recent travel, sick contacts, or new foods.  The patient lives with her  whom does not have any such symptoms.  She noted mild weakness and lightheadedness immediately after her bloody bowel movements.  She otherwise denies fever, chills, nausea, vomiting, myalgias, swollen glands, headaches,dizziness, chest pain, palpitations/flutters, SOB, cough, wheezes, dysuria, hematuria, joint swelling, joint pain, leg swelling, and rashes.     The patient drinks 2-3 glasses of wine daily for her \"tremors\".  Quit smoking in 1998.  She has chronic tremors which are unchanged from baseline.  She is a O2 dependent COPD patient (4L chronically).  She is DNR/DNI                  TODAY:     Subjective:  Doing well. No pain, no light-headedness, no fever or chills.  Had 2 bowel movements since admission, one small, last was more normal and still had some slight " "streaking of blood but mostly formed and brown.  No black stools.  No diarrhea today.  No nausea or vomiting.  Taking orals well, getting normal lunch today.      ROS:   ROS: 10 point ROS neg other than the symptoms noted above in the HPI.       /81 (BP Location: Left arm)  Pulse 81  Temp 98.7  F (37.1  C) (Oral)  Resp 18  Ht 1.6 m (5' 3\")  Wt 53.6 kg (118 lb 2.7 oz)  SpO2 98%  BMI 20.93 kg/m2   EXAM:  General: awake and alert, NAD, oriented x 3  Head: normocephalic  Neck: unremarkable, no lymphadenopathy   HEENT: oropharynx pink and moist    Heart: Regular rate and rhythm, no murmurs, rubs, or gallops  Lungs: clear to auscultation bilaterally with good air movement throughout  Abdomen: soft, non-tender, no masses or organomegaly  Extremities: no edema in lower extremities   Skin unremarkable.            Hospital Course:     Alethea Britt is a 76 year old female with PMH significant for metastatic lung cancer s/p palliative radiation, GERD, HLD, and COPD who now presents with abdominal cramping and subsequent bright red blood per rectum.     Acute diarrhea due to colitis presumed infectious with bloody diarrhea now resolving  6/27/17 -- DDx: colitis (infectious, ischemic, inflammatory)  Hbg 12.4 on arrival.  Baseline 13-14. VSS. CT shows \"nonspecific colitis from distal transverse colon through proximal sigmoid colon.   Sigmoid diverticula without acute diverticulitis.\"  - Protonix 80mg IV bolus given and then 40mg IV, Q12 Protonix  6/28/2017 -- doing well, diarrhea tapering off and blood in stool resolving albeit not yet completely gone.  Hemoglobin has remained stable. Taking orals well.  Stool panel negative.  No antibiotics in past 6 months and does not look like CDiff, particularly with diarrhea now resolving.  Ok for discharge home today with plan to follow-up with primary care provider in next 2-5 days and to recheck hemoglobin at that time.   She is to return if she has worsening bloody stools " "or diarrhea, but symptoms appear to be resolving.        Leukocytosis  6/27/17 -- DDx: stress versus colitis (infectious)  6/28/2017 -- resolved without intervention.  Suspect viral infection as above.       Elevated Fasting Glucose  A1c 5.1 - nothing for diabetes.      Alcohol Dependence  6/27/17 -- Reports drinking 2-3 glasses of wine/day (for \"tremors\", estimates 4-5oz poor)  On admission, appears to not be going through withdrawals  - [facility] protocol in place  - thiamine, folic acid, multivitamin, magnesium/phosphorus therapy initiated (in IV given NPO)  - initiate lorazepam therapy, AS NEEDED  6/28/2017 -- per her and family's report has good intake with well-rounded diet - no need for vitamins on discharge.  No evidence of withdrawal while here.       Esophageal reflux  6/27/17 -- hold home ranitidine given above zantac  6/28/2017 -- resume prior to admission medications on discharge.       Hyperlipidemia LDL goal <160  Not currently utilizing medications     Lung cancer  6/27/17 -- Followed by Dr. Duncan.  No previous chemo.  On palliative care for metastatic lesion to left iliac crest.  Finished radiation 02/10/2017.  Has never undergone lung nodule biopsy, but this is presumed NSCLC.  CT today shows \"two left lung base nodules that have increased in size since previous PET/CT 10/30/2012.  Further eval with CT or PET/CT recommended.\"  - continue PTA tylenol #3  - hold home ibuprofen given GI bleed - can restart ibuprofen once stools have normalized.     6/28/2017 -- no change to plan       COPD (chronic obstructive pulmonary disease) (H)  Chronic Hypoxemic Respiratory Failure  6/27/17 -- Last exacerbation 11/2016.  On 4L NC oxygen daily at home  - schedule duoneb Q3 hours per patient request (states she has duoneb or combivent at least 8-10 times daily at home)  - transition pulmicort inhaler to nebulizer per patient request   6/28/2017 -- resume prior to admission medications - at baseline.      Code Status: DNR " / DNI            Discharge Instructions and Follow-Up:   Discharge diet: Regular   Discharge activity: Activity as tolerated   Discharge follow-up: Follow up with primary care provider in 2 days, recheck hgb at that time.              Discharge Disposition:   Discharged to home      Attestation:  I have reviewed today's vital signs, notes, medications, labs and imaging.  Amount of time performed on this discharge summary: 45 minutes.    Davonte Chan MD, MD

## 2017-06-28 NOTE — DISCHARGE INSTRUCTIONS
Discharge Instructions and Follow-Up:   Discharge diet: Regular   Discharge activity: Activity as tolerated   Discharge follow-up: Follow up with primary care provider in 2 days, recheck hgb at that time.

## 2017-06-28 NOTE — CONSULTS
CARE TRANSITION SOCIAL WORK INITIAL ASSESSMENT:      Met with: Patient and Family.    DATA  Principal Problem:    GI bleed  Active Problems:    Esophageal reflux    Hyperlipidemia LDL goal <160    Lung cancer    COPD (chronic obstructive pulmonary disease) (H)    Leukocytosis    Lower GI bleed       Primary Care Clinic Name:  ([facility])  Primary Care MD Name:  ([place])  Contact information and PCP information verified: Yes      ASSESSMENT  Cognitive Status: awake, alert and oriented.       Resources List: Home Care     Lives With: spouse        Description of Support System: Supportive, Involved   Who is your support system?:    Support Assessment: Adequate family and caregiver support, Adequate social supports   Insurance Concerns: No Insurance issues identified        This writer met with pt and pts spouse introduced self and role. Discussed discharge planning and medicare guidelines in regards to home care, TCU and LTC. Pt feels that home care would be beneficial, prefers to use [facility] ([phone] Fax: [phone]). Referral placed and pt will dc home today.       PLAN    [facility]    Discharge Planner   Discharge Plans in progress: home  Barriers to discharge plan: none  Follow up plan: home care       Entered by: Yokasta Huffman 06/28/2017 3:16 PM             Yokasta Huffman MSW, LICSW, [phone]

## 2017-06-29 NOTE — LETTER
Health Care Home - Access Care Plan    About Me  Patient Name:  Alethea Brady    YOB: 1940  Age:                            76 year old   Mara MRN:         [id number] Telephone Information:     Home Phone [phone]   Mobile Not on file.       Address:    [address] [address] [address] Email address:  [email or web address]      Emergency Contact(s)  Name Relationship Lgl Grd Work Phone Home Phone Mobile Phone   1. MARIE BRADY Spouse   [phone]    2. LEO FRAGOSO* Daughter  [phone]    3. VANESA ACEVEDO* Daughter   [phone]    4. PAOLO VILLATORO Daughter  [phone]    5. J CARLOS BRADY Son   [phone]              Health Maintenance: Routine Health maintenance Reviewed: Due/Overdue   Health Maintenance Due   Topic Date Due     COPD ACTION PLAN Q1 YR  02/20/2013     LIPID MONITORING Q1 YEAR  03/24/2013     COLON CANCER SCREEN (SYSTEM ASSIGNED)  12/21/2015     ADVANCE DIRECTIVE PLANNING Q5 YRS  12/01/2016         My Access Plan  Medical Emergency 911   Questions or concerns during clinic hours Primary Clinic Line, I will call the clinic directly: Primary Clinic: [facility]- [phone]   24 Hour Appointment Line [phone] or  [phone] [phone])  (toll free)   24 Hour Nurse Line [phone] (toll free)   Questions or concerns outside clinic hours 24 Hour Appointment Line, I will call the after-hours on-call line:   [facility] [phone] or [phone] ([phone]) (toll-free)   Preferred Urgent Care Preferred Urgent Care: [facility], [phone]   Preferred Hospital Preferred Hospital: [place], [place]  [phone]   Preferred Pharmacy [facility] [place], [place] - [address] Behavioral Health Crisis Line [facility], [phone] or 911     My Care Team Members  Patient Care Team       Relationship  Specialty Notifications Start End    Amarilis Almaguer PA-C PCP - General Physician Assistant  9/29/14     Phone: [phone] Fax: [phone]         [facility] [address] [facility] [place] [address]    Zarina Duncan MD MD Oncology  1/24/17     Phone: [phone] Pager: [phone] Fax: [phone]        [facility] CANCER [facility] [address] [place] [address]    Aidee Seo MD MD Radiation Oncology  1/24/17     Phone: [phone] Fax: [phone]          [facility] [address] [facility] [address]    eRa Hammond, RN Clinic Care Coordinator  Admissions 6/29/17     Phone: [phone] Fax: [phone]            My Medical and Care Information  Problem List   Patient Active Problem List   Diagnosis     Osteoporosis     Chronic airway obstruction (H)     Esophageal reflux     Lumbago     Disturbance of skin sensation     GUILLIAN BARRE     Hyperlipidemia LDL goal <160     z-Advanced directives, counseling/discussion     Health Care Home     Lung cancer     Weakness and numbness     CIDP (chronic inflammatory demyelinating polyneuropathy) (H)     Vitamin B12 deficiency disease     Health care maintenance -- DO NOT CALL     Anisocoria     COPD (chronic obstructive pulmonary disease) (H)     Bone metastasis (H)     GI bleed     Leukocytosis     Lower GI bleed      Current Medications and Allergies:  See printed Medication Report

## 2017-06-29 NOTE — PROGRESS NOTES
Clinic Care Coordination Contact  OUTREACH    Referral Information:  Referral Source: CTS  Reason for Contact: Patient discharged from [facility] [place] 6/28/17 to home with [facility].   Care Conference: No     Universal Utilization:   ED Visits in last year: 2  Hospital visits in last year: 1  Last PCP appointment: 11/18/16        Multiple Providers or Specialists: Oncology, radiation    Clinical Concerns:  Current Medical Concerns: Patient returned RN CC call. She states home care was there when RN CC called. She states they are going to have in home Palliative Care. She states she is doing little better. She states she is still bleeding little bit but it has slowed down. She knows that if it increases that she needs to get to the hospital right away. Patient has hospital follow up with PCP tomorrow 6/30/17. She denies having any questions or concerns regarding her discharge instructions.   Current Behavioral Concerns: No concerns.     Education Provided to patient: RN CC educated patient about Care Coordination services, how we collaborate with home care. Reviewed discharge instructions.    Clinical Pathway Name: None  Clinical Pathway: None    Medication Management:  Patient is independent in medication management. Pt. verbalized adherence and understanding of medication regimen, side effects, purposes.    Functional Status:  Mobility Status: Independent  Equipment Currently Used at Home: none, DME oxygen  Transportation: spouse or family           Psychosocial:  Current living arrangement:: I live in a private home with spouse  Financial/Insurance: No concerns at this time.       Resources and Interventions:  Current Resources: Home Care; Home Care, DME home oxygen  RN mailed out a letter to pt's home with RN CC contact information, explanation of CC services and patient care plan.           Advanced Care Plans/Directives on file:: Yes        Goals:   n/a              Barriers: Recent GI bleed, lung cancer with mets.    Strengths: great family and friend support, is now getting in home palliative care home care, willing to work with care coordination and care team.  Patient/Caregiver understanding: She verbalized understanding of her discharge instructions.   Frequency of Care Coordination: in 1 month  Upcoming appointment: 06/30/17 (PCP hospt f/u)     Plan:   Patient will continue to follow treatment plan as directed and follow up with PCP with concerns ongoing.   Patient to attend hospital f/u with PCP 6/30/17.  RN CC to f/u with patient or home care in one month.    Rea Hammond RN, [facility]  RN Care Coordinator  [facility]  Phone # [phone]  6/30/2017 7:49 AM

## 2017-06-29 NOTE — PROGRESS NOTES
Clinic Care Coordination Contact  [facility]/Voicemail    Referral Source: CTS    Clinical Data: Care Coordinator Outreach, d/c from [facility] 6/28/17 to home with [facility]. Patient has hospital f/u with PCP 6/30/17.    Outreach attempted x 1.  Left message on voicemail with call back information and requested return call.    Plan: Care Coordinator will mail out care coordination introduction letter with care coordinator contact information and explanation of care coordination services. Care Coordinator will try to reach patient again in 1-2 business days.    Rea Hammond RN, [facility]  RN Care Coordinator  [facility]  Phone # [phone]  6/29/2017 1:16 PM

## 2017-06-29 NOTE — LETTER
June 29, 2017      Alethea Britt  [address] [address]    Dear Alethea,  I am the Clinic Care Coordinator that works with your primary care provider's clinic. I have been trying to reach you recently to introduce Clinic Care Coordination and to see if there was anything I could assist you with.  Below is a description of what Clinic Care Coordination is and how I can further assist you.     The Clinic Care Coordinator role is a Registered Nurse and/or  who understands the health care system. The goal of Clinic Care Coordination is to help you manage your health and improve access to the [facility] system in the most efficient manner.  The Registered Nurse can assist you in meeting your health care goals by providing education, coordinating services, and strengthening the communication among your providers. The  can assist you with financial, behavioral, psychosocial, and chemical dependency and counseling/psychiatric resources.    Please feel free to keep this letter and contact information to contact me at [phone] with any further questions or concerns that may arise. We at [facility] are focused on providing you with the highest-quality healthcare experience possible and that all starts with you.       Sincerely,     Rea Hammond    Enclosed: I have enclosed a copy of a 24 Hour Access Plan. This has helpful phone numbers for you to call when needed. Please keep this in an easy to access place to use as needed.

## 2017-06-29 NOTE — TELEPHONE ENCOUNTER
ED/UC/IP follow up phone call: [facility] discharge 6/28/17  Colitis    RN please call to follow up.    Number of ED visits in past 12 months = 1

## 2017-06-29 NOTE — TELEPHONE ENCOUNTER
See Care Coordination encounter 6/29/2017        Rea Hammond RN, [facility]  RN Care Coordinator  [facility]  Phone # [phone]  6/29/2017 1:02 PM

## 2017-06-29 NOTE — PROGRESS NOTES
Transition Communication Hand-off for Care Transitions to Next Level of Care Provider    Name: Alethea Britt  MRN #: [id number]  Primary Care Provider: Amarilis Almaguer  Primary Care MD Name:  (Marshallrebecca)  Primary Clinic: [facility] [address] [facility] [address]  Primary Care Clinic Name:  ([facility])  Reason for Hospitalization:  Colitis [K52.9]  Gastrointestinal hemorrhage, unspecified gastrointestinal hemorrhage type [K92.2]  Admit Date/Time: 6/27/2017  4:53 AM  Discharge Date: 6/28/17  Payor Source: Payor: [employer] / Plan: [employer]AN / Product Type: HMO /     Readmission Assessment Measure (ROSS) Risk Score/category: none           Reason for Communication Hand-off Referral: Fragility    Discharge Plan:home care       Concern for non-adherence with plan of care:   Y/N no  Discharge Needs Assessment:      Already enrolled in Tele-monitoring program and name of program:  no  Follow-up specialty is recommended: No    Follow-up plan:  Future Appointments  Date Time Provider Department Center   6/30/2017 1:40 PM Amarilis Almaguer, PAIshC [facility]       Any outstanding tests or procedures:        Referrals     Future Labs/Procedures    Home Care Referral     Comments:    Your provider has referred you to: FM[facility]: [facility] [phone]   [email or web address]    Extended Emergency Contact Information  Primary Emergency Contact: Carson Britt  Address: [address] [place] [address] [facility]  Home Phone: [phone]  Relation: Spouse  Secondary Emergency Contact: kathia bacon   [facility]  Home Phone: [phone]  Work Phone: [phone]  Relation: Daughter    Patient Anticipated Discharge Date: 6/28/17   RN, PT, HHA to begin 24 - 48 hours after discharge.  PLEASE EVALUATE AND TREAT (Evaluation timeline is 24 - 48 hrs. Please call if there  is need for a variance to this timeline).    REASON FOR REFERRAL: Assessment & Treatment: PT and RN    ADDITIONAL SERVICES NEEDED: None    OTHER PERTINENT INFORMATION: Patient was last seen by provider on 6/28/17 for colitis.    Current Outpatient Prescriptions:  VITAMIN D, CHOLECALCIFEROL, PO, Take 2,000 Units by mouth daily, Disp: , Rfl:   polyethylene glycol (MIRALAX/GLYCOLAX) powder, Take 1 capful by mouth daily as needed for constipation, Disp: , Rfl:   ranitidine (ZANTAC) 150 MG tablet, Take 1 tab 1-2 times per day. (Patient taking differently: Take 150 mg by mouth 2 times daily as needed Take 1 tab 1-2 times per day.), Disp: 60 tablet, Rfl: 11  ORDER FOR DME, Oxygen 2.0 -4.0 liters n/c continuous, Disp: , Rfl:   COLACE 100 MG OR CAPS, 1 CAPSULE ORALLY DAILY AS NEEDED, Disp: 60, Rfl: 0  acetaminophen (TYLENOL) 500 MG tablet, Take 1,000 mg by mouth every 8 hours as needed for mild pain Rapid release, Disp: , Rfl:   COMBIVENT RESPIMAT  MCG/ACT inhaler, INHALE TWO TO THREE PUFFS INTO THE LUNGS EVERY 6 HOURS (Patient taking differently: INHALE TWO TO THREE PUFFS INTO THE LUNGS EVERY 2 HOURS), Disp: 4 g, Rfl: 11  ipratropium - albuterol 0.5 mg/2.5 mg/3 mL (DUONEB) 0.5-2.5 (3) MG/3ML nebulization, Take 1 vial (3 mLs) by nebulization 4 times daily, Disp: 1 Box, Rfl: 5  budesonide (PULMICORT FLEXHALER) 180 MCG/ACT inhaler, Inhale 2 puffs into the lungs 2 times daily, Disp: 1 Inhaler, Rfl: 11  ORDER FOR DME, NEBULIZER machine, Disp: 1 Device, Rfl: 0      Patient Active Problem List:     Osteoporosis     Chronic airway obstruction (H)     Esophageal reflux     Lumbago     Disturbance of skin sensation     GUILLIAN BARRE     Hyperlipidemia LDL goal <160     z-Advanced directives, counseling/discussion     Health Care Home     Lung cancer     Weakness and numbness     CIDP (chronic inflammatory demyelinating polyneuropathy) (H)     Vitamin B12 deficiency disease     Health care maintenance -- DO NOT CALL      Anisocoria     COPD (chronic obstructive pulmonary disease) (H)     Bone metastasis (H)     GI bleed     Leukocytosis     Lower GI bleed      Documentation of Face to Face and Certification for Home Health Services    I certify that patient, Alethea Britt is under my care and that I, or a Nurse Practitioner or Physician's Assistant working with me, had a face-to-face encounter that meets the physician face-to-face encounter requirements with this patient on: 6/28/2017.    This encounter with the patient was in whole, or in part, for the following medical condition, which is the primary reason for Home Health Care: Weakness.    I certify that, based on my findings, the following services are medically necessary Home Health Services: Nursing and Physical Therapy    My clinical findings support the need for the above services because: Physical Therapy Services are needed to assess and treat the following functional impairments: Weakness.    Further, I certify that my clinical findings support that this patient is homebound (i.e. absences from home require considerable and taxing effort and are for medical reasons or [employer] services or infrequently or of short duration when for other reasons) because: Requires assistance of another person or specialized equipment to access medical services because patient: Requires supervision of another for safe transfer..    Based on the above findings, I certify that this patient is confined to the home and needs intermittent skilled nursing care, physical therapy and/or speech therapy.  The patient is under my care, and I have initiated the establishment of the plan of care.  This patient will be followed by a physician who will periodically review the plan of care.    Physician/Provider to provide follow up care: Amarilis Almaguer certified Physician at time of discharge: Dr. Chan    Please be aware that coverage of these services is subject to the  terms and limitations of your health insurance plan.  Call member services at your health plan with any benefit or coverage questions.            Key Recommendations:  Pt discharged home today with spouse, agreeable to home care, referral placed to [facility] ([phone] Fax: [phone])    Yokasta ROBLES, [facility], [facility] [phone]    AVS/Discharge Summary is the source of truth; this is a helpful guide for improved communication of patient story

## 2017-06-30 NOTE — PROGRESS NOTES
SUBJECTIVE:                                                    Alethea Britt is a 76 year old female who presents to clinic today for the following health issues:    Hospital Follow-up Visit:    Hospital/Nursing Home/IP Rehab Facility: [facility]  Date of Admission: 6/27/2017  Date of Discharge: 6/28/2017  Reason(s) for Admission: Colitis [K52.9]  Gastrointestinal hemorrhage, unspecified gastrointestinal hemorrhage type [K92.2]            Problems taking medications regularly:  None       Medication changes since discharge: None       Problems adhering to non-medication therapy:  None    Summary of hospitalization:  [facility] discharge summary reviewed  Diagnostic Tests/Treatments reviewed.  Follow up needed: see below  Other Healthcare Providers Involved in Patient s Care:         Homecare and Hospice  Update since discharge: improved.     Post Discharge Medication Reconciliation: discharge medications reconciled, continue medications without change.  Plan of care communicated with patient and family     Coding guidelines for this visit:  Type of Medical   Decision Making Face-to-Face Visit       within 7 Days of discharge Face-to-Face Visit        within 14 days of discharge   Moderate Complexity [id number]   High Complexity [id number]          Here with .    Patient had bloody diarrhea, admitted for hemoglobin monitoring.  Felt to be infectious.  Since discharge, did still have further bloody diarrhea but none since yesterday.  No fevers.  Belly feels a bit sore but not painful.      COPD - stable.    Lung cancer with александр - decision for palliative only - palliative radiation of hip caused to be sick for awhile and lost 10 pounds, but really helped pain.  Is now dull pain and not needing to take anything for it.  Now notes pain in L upper arm, but feels this is more muscle type pain and is starting home Physical Therapy.  Not wanting xray.  However, would like to discuss morphine  "v medical marijuana for as cancer spreads.  Would like to get ball rolling on this now. Has strong preference for trying medical marijauna.  Also wonders if will help her chronic tremors.    Problem list and histories reviewed & adjusted, as indicated.  Additional history: as documented    Reviewed and updated as needed this visit by clinical staff       Reviewed and updated as needed this visit by Provider         ROS:  Constitutional, HEENT, cardiovascular, pulmonary, GI, musculoskeletal, and psych systems are negative, except as otherwise noted.    OBJECTIVE:     /74 (BP Location: Right arm, Patient Position: Chair, Cuff Size: Adult Regular)  Pulse 100  Temp 97.7  F (36.5  C) (Tympanic)  Resp 16  Ht 5' 3\" (1.6 m)  Wt 118 lb (53.5 kg)  SpO2 98%  BMI 20.9 kg/m2  Body mass index is 20.9 kg/(m^2).  GENERAL: healthy, alert and no distress  RESP: lungs with very poor air movement but otherwise clear to auscultation - no rales, rhonchi or wheezes  CV: regular rate and rhythm, normal S1 S2, no S3 or S4, no murmur, click or rub  ABDOMEN: soft, nontender, no masses and bowel sounds hypernormal   PSYCH: mentation appears normal, affect normal/bright    ASSESSMENT/PLAN:       ICD-10-CM    1. Colitis K52.9    2. Gastrointestinal hemorrhage, unspecified gastrointestinal hemorrhage type K92.2 Hemoglobin   3. Bone metastasis (H) C79.51 Signing up for medical marijuana   4. Malignant neoplasm of lung, unspecified laterality, unspecified part of lung (H) C34.90    5. Chronic obstructive pulmonary disease, unspecified COPD type (H) J44.9 stable   6. Medical marijuana patient Z79.899     signed up by PCP on 6/30/17       Patient Instructions   Lab today    If blood or diarrhea return, need to be seen.    Try to put on some weight.    Let me know if need anything, if hospice not meeting needs, etc.    I will work on marijuana certification by Wed but hopefully today.       Amarilis Almaguer PA-C  [facility] " [place]

## 2017-06-30 NOTE — PROGRESS NOTES
Karen Claire,    Your hemoglobin is stable.  Continue plan discussed in clinic today.    I did your thomas certification.  Over the next few days, you should receive an email to access your application.  If you have any troubles with it, let me know.    Please call clinic at [phone] if you have questions.    Amarilis Almaguer PA-C

## 2017-06-30 NOTE — MR AVS SNAPSHOT
After Visit Summary   6/30/2017    Alethea Britt    MRN: [id number]           Patient Information     Date Of Birth          1940        Visit Information        Provider Department      6/30/2017 1:40 PM Amarilis Almaguer PA-C [facility]        Today's Diagnoses     Colitis    -  1    Gastrointestinal hemorrhage, unspecified gastrointestinal hemorrhage type        Bone metastasis (H)        Malignant neoplasm of lung, unspecified laterality, unspecified part of lung (H)        Chronic obstructive pulmonary disease, unspecified COPD type (H)          Care Instructions    Lab today    If blood or diarrhea return, need to be seen.    Try to put on some weight.    Let me know if need anything, if hospice not meeting needs, etc.    I will work on marijuana certification by Wed but hopefully today.           Follow-ups after your visit        Who to contact     If you have questions or need follow up information about today's clinic visit or your schedule please contact [facility] directly at [phone].  Normal or non-critical lab and imaging results will be communicated to you by [employer], letter or phone within 4 business days after the clinic has received the results. If you do not hear from us within 7 days, please contact the clinic through [employer] or phone. If you have a critical or abnormal lab result, we will notify you by phone as soon as possible.  Submit refill requests through [employer] or call your pharmacy and they will forward the refill request to us. Please allow 3 business days for your refill to be completed.          Additional Information About Your Visit        [employer] Information     [employer] gives you secure access to your electronic health record. If you see a primary care provider, you can also send messages to your care team and make appointments. If you have questions, please call your primary care clinic.  If you do not have a  "primary care provider, please call [phone] and they will assist you.        Care EveryWhere ID     This is your Care EveryWhere ID. This could be used by other organizations to access your [place] medical records  [id number]        Your Vitals Were     Pulse Temperature Respirations Height Pulse Oximetry BMI (Body Mass Index)    100 97.7  F (36.5  C) (Tympanic) 16 5' 3\" (1.6 m) 98% 20.9 kg/m2       Blood Pressure from Last 3 Encounters:   06/30/17 137/74   06/28/17 153/81   05/28/17 152/81    Weight from Last 3 Encounters:   06/30/17 118 lb (53.5 kg)   06/27/17 118 lb 2.7 oz (53.6 kg)   01/30/17 120 lb 6.4 oz (54.6 kg)              We Performed the Following     Hemoglobin          Today's Medication Changes          These changes are accurate as of: 6/30/17  2:33 PM.  If you have any questions, ask your nurse or doctor.               These medicines have changed or have updated prescriptions.        Dose/Directions    * ipratropium - albuterol 0.5 mg/2.5 mg/3 mL 0.5-2.5 (3) MG/3ML neb solution   Commonly known as:  DUONEB   This may have changed:  Another medication with the same name was changed. Make sure you understand how and when to take each.   Changed by:  Amarilis Almaguer PA-C        Dose:  3 mL   Take 1 vial (3 mLs) by nebulization 4 times daily   Quantity:  1 Box   Refills:  5       * COMBIVENT RESPIMAT  MCG/ACT inhaler   This may have changed:  See the new instructions.   Used for:  Chronic obstructive pulmonary disease, unspecified COPD type (H)   Generic drug:  Ipratropium-Albuterol   Changed by:  Amarilis Almaguer PA-C        INHALE TWO TO THREE PUFFS INTO THE LUNGS EVERY 6 HOURS   Quantity:  4 g   Refills:  11       * Notice:  This list has 2 medication(s) that are the same as other medications prescribed for you. Read the directions carefully, and ask your doctor or other care provider to review them with you.             Primary Care Provider Office Phone # Fax #    " Amarilis Almaguer PA-C [phone]       [facility] [address] [facility] [address]        Equal Access to Services     HERNANDEZ GONZALEZ : Hadkwesi ric juarez shakeel Bennett, waaxda luqadaha, qaybta kaalmada mike, catrina macedo laRaizajavier waite. So [facility] [phone].    ATENCIÓN: Si habla español, tiene a ziegler disposición servicios gratuitos de asistencia lingüística. Llame al [phone].    We comply with applicable federal civil rights laws and [place] laws. We do not discriminate on the basis of race, color, national origin, age, disability sex, sexual orientation or gender identity.            Thank you!     Thank you for choosing [facility]  for your care. Our goal is always to provide you with excellent care. Hearing back from our patients is one way we can continue to improve our services. Please take a few minutes to complete the written survey that you may receive in the mail after your visit with us. Thank you!             Your Updated Medication List - Protect others around you: Learn how to safely use, store and throw away your medicines at www.disposemymeds.org.          This list is accurate as of: 6/30/17  2:33 PM.  Always use your most recent med list.                   Brand Name Dispense Instructions for use Diagnosis    acetaminophen 500 MG tablet    TYLENOL     Take 1,000 mg by mouth every 8 hours as needed for mild pain Rapid release        budesonide 180 MCG/ACT inhaler    PULMICORT FLEXHALER    1 Inhaler    Inhale 2 puffs into the lungs 2 times daily    Chronic obstructive pulmonary disease, unspecified COPD type (H)       COLACE 100 MG capsule   Generic drug:  docusate sodium     60    1 CAPSULE ORALLY DAILY AS NEEDED        * ipratropium - albuterol 0.5 mg/2.5 mg/3 mL 0.5-2.5 (3) MG/3ML neb solution    DUONEB    1 Box    Take 1 vial (3 mLs) by nebulization 4 times daily        * COMBIVENT RESPIMAT  MCG/ACT inhaler   Generic  drug:  Ipratropium-Albuterol     4 g    INHALE TWO TO THREE PUFFS INTO THE LUNGS EVERY 6 HOURS    Chronic obstructive pulmonary disease, unspecified COPD type (H)       * order for DME      Oxygen 2.0 -4.0 liters n/c continuous    Chronic airway obstruction, not elsewhere classified       * order for DME     1 Device    NEBULIZER machine    COPD with acute exacerbation (H), Cough       polyethylene glycol powder    MIRALAX/GLYCOLAX     Take 1 capful by mouth daily as needed for constipation        ranitidine 150 MG tablet    ZANTAC    60 tablet    Take 1 tab 1-2 times per day.    Gastroesophageal reflux disease without esophagitis       VITAMIN D (CHOLECALCIFEROL) PO      Take 2,000 Units by mouth daily        * Notice:  This list has 4 medication(s) that are the same as other medications prescribed for you. Read the directions carefully, and ask your doctor or other care provider to review them with you.

## 2017-06-30 NOTE — NURSING NOTE
"Chief Complaint   Patient presents with     Hospital F/U       Initial /74 (BP Location: Right arm, Patient Position: Chair, Cuff Size: Adult Regular)  Pulse 100  Temp 97.7  F (36.5  C) (Tympanic)  Resp 16  Ht 5' 3\" (1.6 m)  Wt 118 lb (53.5 kg)  SpO2 98%  BMI 20.9 kg/m2 Estimated body mass index is 20.9 kg/(m^2) as calculated from the following:    Height as of this encounter: 5' 3\" (1.6 m).    Weight as of this encounter: 118 lb (53.5 kg).  Medication Reconciliation: complete    Health Maintenance that is potentially due pending provider review:  Colonoscopy/FIT    Pt declines to have colonoscopy/fit    Alyssa NATARAJAN MA  .      "

## 2017-06-30 NOTE — PATIENT INSTRUCTIONS
Lab today    If blood or diarrhea return, need to be seen.    Try to put on some weight.    Let me know if need anything, if hospice not meeting needs, etc.    I will work on marijuana certification by Wed but hopefully today.

## 2017-07-07 NOTE — TELEPHONE ENCOUNTER
Lidya from [facility] wanted Amarilis to know that Alethea was seen today and was accepted into the Medical Cannabis program. They started therapy today.  If you would have any questions or have any questions Lidya can be contacted at [phone]

## 2017-07-10 PROBLEM — Z79.899 MEDICAL CANNABIS USE: Status: ACTIVE | Noted: 2017-01-01

## 2017-07-20 NOTE — TELEPHONE ENCOUNTER
Form for FMLA on Janice for Naomi Howell was taken to the front dest for pickup.  Alethea was notified.  [facility]  [facility] [place]

## 2017-08-07 NOTE — TELEPHONE ENCOUNTER
S-(situation): home care nurse calling to report Alethea's pain control     B-(background): she has tried vicodin, tramadol, ibuprofen, medical marijuana and flexeril with no help.    A-(assessment): pain control    R-(recommendations): home care nurse asking if can try gabapentin  Madeline Reid RN

## 2017-08-07 NOTE — TELEPHONE ENCOUNTER
Reason for Call:  Other     Detailed comments: [facility] - [phone] - Patients pain is really bad in the morning it a 8 or 9 in pain scale - Its in her left shoulder down her side - She has had this pain a long time .  She has tried different pain medications  She is trying medical pot and it is not helping Do we do any genotype testing?  Please call Slava back     Phone Number Patient can be reached at:     Best Time:     Can we leave a detailed message on this number? YES    Call taken on 8/7/2017 at 3:45 PM by Shyanne Rose

## 2017-08-28 NOTE — TELEPHONE ENCOUNTER
Spoke with nurse.  Patient now in excruciating pain.  Admitted to hospice on Fri.  Had done well with dilaudid at a recent hospitalization apparently, though RN not sure when this hospitalization was (I am not seeing in end of June hospitalization).  Failed tramadol, didn't find any relief with oxycodone 2.5 and patient did not want to try titrating.  Medical director starting methadone scheduled as well, with plan to titrate q 5 days.  Starting dexamethasone today as well.  Ok with neb solution.    Discussed with Dr Pike due to my concern for dilaudid being started as scheduled ca while methadone is newly prescribed, and patient drinking some wine for tremor relief per RN.  Suggests small refill until we can clarify with hospice medical director.  Please route to hospice medical director.      RN clarifies later that dilaudid was started with Dr Marquis rubio hospice director 2 days ago.  Used 20 mg oxycodone over 24 hrs Fri-Sat hence was switched to dilaudid.    Discussed with RN Shyanne Fagan, .  She is agreeable to update in 3 days and feels hospice director will be taking over prescribing, otherwise we need dosing clarification.

## 2017-08-28 NOTE — TELEPHONE ENCOUNTER
Reason for Call:  Home Health Care    Shyanne with [facility] called regarding (reason for call):  Alethea was admitted to Hospice on 8/25/17  They need new Rx for her Dilaudid 2 mg tablets with directions to take 1-2 mg every 4 hrs (scheduled)  She has been on Budesonide powder (Pulmcort) 2 puffs BID but is no longer able to hold it or use it. They are asking if this could be changed to a neb solution instead. She does have a neb machine  [facility] pharmacy  Pt Provider: Amarilis Almaguer    Phone Number Homecare Nurse can be reached at: [phone]    Can we leave a detailed message on this number? YES    Call taken on 8/28/2017 at 2:28 PM by Radha Chung

## 2017-08-29 NOTE — TELEPHONE ENCOUNTER
Hospice Update-No Response Required  ADMISSION SUMMARY NOTE BY: Mary Glover RN  PRIMARY PROVIDER: Dr. Geovany Pike  PRIMARY DX: Lung Ca with mets to bone (Lft hip)  CO-MORBIDITIES: COPD (O2 dependent), Chronic Resp. Failure, Cancer related pain in left shoulder, ETOH dependence. (unrelated Dx: Gall stones, Osteoporosis, Diverticulosis, Diaphragm Hernia, Low back pain, Tremors, Impaired fasting glucose, GERD, Diarrhea, Nicotine dependence)  POLST: DRN/JERADI  CURRENT STATUS/LCD'S FOR CERT: KPS 60; A&Ox4, able to verbally communicate. Increased SOB/litre flow of O2 d/t recent sharp decline in health status, lung Ca with mets to bone at left hip causing severe pain that Pt is having difficulty in tx d/t s/e from analgesics; pain 8/10 at left hip/shoulder. Recent fall without fx/head strike. Increased weakness, SOB with minimal activity. Small appetite/intake amount, reg diet. Last BM yesterday; averages QOD-Q2days. Denies alterations r/t integumentary system. Enjoys 4 4oz glasses of Chardonnay/day; brings her benefit of decreased pain/parkinsonian-like tremors and increases energy/ability to cook dinner/increase appetite. Resistive to increased help with cares though voicing increasing difficulties.   MED CHANGES: D/C'd Gabapentin per Pt pref as was ineffective, Colace now daily as newly started regimen of Oxycodone 2.5mg QID and q2h PRN with plan to assess efficacy along with plan to convert to Methadone after a few days/completion of f/u comfortable dying protocol; Duoneb- added q2h PRN to QID. Added Lorazepam, Atropine, and Scopolamine patches. (holding off on ordering Roxanol as Pt c/o hx of increased WOB after having had Tylenol w/ Codeine; may order Oxyfast in future instead.)  PATIENT GOALS: To be as comfortable as possible/least amount of sx in EOL while remaining at home until last breath.  FAMILY GOALS: To help Pt manage cares/meds at home.  SMART GOAL: To have pain more tolerable at 2-3/10 per numeric  scale.  PLAN FOR NEXT 2 WEEKS: SNV 1-3x/wk with 5 PRN for med/sx mgt, emotional/education EOL support. MSW 1-3x/mo for any resource connection needs and emotional support. MT referral for pain/relaxation. Elected  services. Declined HHAs and Volunteers.  Name: Alethea Britt  Pt age: 76yrs old  Diagnosis: Lung Ca with bone mets  Residence type SNF/[facility]/private home: Private home  Visit Frequency for RN: every few days until pain sx managed, then 1x/wk  Visit Frequency for HHA: Declined at this time.  Level of function: Able to do most cares, would require assist with showers for safety/sx of SOB but elects to do sponge baths independently despite how taxing it is on Pt.  Nutritional Status: small appetite with small to fair intake; 2 smaller meals with a 'bigger dinner of meat, potatoes type' meal. Enjoys about 4 4oz glasses of wine as reported to help with decreasing tremors/increasing appetite and relaxation/decreasing pain.  Symptoms being managed: Pain, SOB, anxiety.

## 2017-09-01 NOTE — TELEPHONE ENCOUNTER
Reason for Call:  Other FYI    Detailed comments: Alethea is taking Dilaudid 5-7 times per day prn, Methadone is helpful and she takes Lorazepam when she takes her Dilaudid.    Phone Number Patient can be reached at: Other phone number:  Kim  (Hospice Nurse)  [phone]    Best Time: any    Can we leave a detailed message on this number? YES    Call taken on 9/1/2017 at 3:56 PM by Ava Uribe

## 2017-09-05 NOTE — TELEPHONE ENCOUNTER
Hospice Update-No Response Required  IDT NOTE ON 9-3-17 BY: Julie Behrendt, RN  PRIMARY PROVIDER: Dr. Geovany Pike  PRIMARY DX: Lung Ca with mets to bone (Lft hip)  CO-MORBIDITIES: COPD (O2 dependent), Chronic Resp. Failure, Cancer related pain in left shoulder, ETOH dependence. (unrelated Dx: Gall stones, Osteoporosis, Diverticulosis, Diaphragm Hernia, Low back pain, Tremors, Impaired fasting glucose, GERD, Diarrhea, Nicotine dependence)  POLST: DRIMMANUEL/VISH  CURRENT STATUS: 76 y.o.  female who resides at home where her family is providing 24 hour cares/supervision. Pt. able to verbally communicate, having periods of confusion, agitation and increased anxiety. Sleeping 20 hrs/day. Requires extensive AX1 with all ADLS/cares, no longer able to ambulate using w/c for all mobility requires her family to propel w/c. Appetite is fair eating small portion sized meals 2 x/day eating 50%, requires total assist with hand feeding d/t tremors.  Increased weakness, SOB with minimal activity O2 at 4 LPM continuous, no longer able to use pulmicort inhaler this was changed to neb.  Dizziness with changes in position, started scopolamine patches which with good effects. Drinking less wine 1 glass of 2- 4oz glasses of Chardonnay/day; brings her benefit of decreased pain/parkinsonian-like tremors and increases energy/increase appetite. Pt. had severe pain in her hip/shoulder rated 8-10/10 no relief with oxycodone, was using dilaudid 1-2 mg Q 2-3 hrs around the clock started on methadone 5 mg BID which has been helpful AEB a decrease of PRN dilaudid needed rating pain at a 3/10 per numeric pain scale. Pt. had no BM X 6 days family did not give the bisacodyl supp as instructed until day 6 finally had results taking senna-s and miralax daily.   MED CHANGES: Discontinued oxycodone (per pt. request and was ineffective), started methadone 5 mg BID for pain, added bisacodyl supp's 10 mg QD PRN constipation, increased scopolamine 1.5 mg  to 2 patches for dizziness, discontinued pulmicort inhaler changed to pulmicort neb solution 0.5 mg 1 vial daily via neb, dilaudid 1-2 mg Q 2 hrs PRN, discontinued scheduled dilaudid, added dexamethasone 4 mg/d for pain adjuvant/energy/appetite, scheduled senna-s 2 tabs BID for constipation.                                                                                                                                                                                                                                                               PATIENT GOALS: To be as comfortable as possible/least amount of sx in EOL while remaining at home until last breath.  FAMILY GOALS: To help pt manage cares/meds at home.  SMART GOAL: To have pain more tolerable at 2-3/10 per numeric scale.  PLAN FOR NEXT 2 WEEKS: SNV 2-3x/wk with 5 PRN for sx management, assess pain, falls/safety, anxiety, bowels, tremors, ability to manage care at home and provide ongoing education and support on EOL process. MSW 1-3x/mo for psychosocial support/resources. MT 1-4 x/mo for pain adjuvant, promote comfort, reduce anxiety/restlessness.  for spiritual support.                                                                                                                                                                                                                      BEREAVEMENT RISK: Bereavement risk is gauged low, as family is primarily at a place of acceptance of Marty's prognosis and main goal is to keep her comfortable at home. This will remain low with grief, loss, EOL education and counseling.

## 2017-10-24 NOTE — PROGRESS NOTES
Clinic Care Coordination Contact    Situation: Patient chart reviewed by care coordinator.    Background: [facility] review.    Assessment: Per chart review, patient has passed away.     Plan/Recommendations: No further care coordination.    Rea Hammond RN, [facility]  RN Care Coordinator  [facility]  Phone # [phone]  10/24/2017 8:13 AM

## 2023-04-03 PROBLEM — C79.51 MALIGNANT NEOPLASM METASTATIC TO BONE (H): Chronic | Status: ACTIVE | Noted: 2017-01-01
